# Patient Record
Sex: MALE | Race: WHITE | NOT HISPANIC OR LATINO | URBAN - METROPOLITAN AREA
[De-identification: names, ages, dates, MRNs, and addresses within clinical notes are randomized per-mention and may not be internally consistent; named-entity substitution may affect disease eponyms.]

---

## 2018-10-10 ENCOUNTER — INPATIENT (INPATIENT)
Facility: HOSPITAL | Age: 73
LOS: 2 days | Discharge: HOME | DRG: 446 | End: 2018-10-13
Attending: SPECIALIST | Admitting: SPECIALIST
Payer: COMMERCIAL

## 2018-10-10 VITALS
WEIGHT: 175.05 LBS | OXYGEN SATURATION: 98 % | RESPIRATION RATE: 17 BRPM | DIASTOLIC BLOOD PRESSURE: 80 MMHG | SYSTOLIC BLOOD PRESSURE: 160 MMHG | HEART RATE: 70 BPM | TEMPERATURE: 98 F

## 2018-10-10 DIAGNOSIS — Z95.5 PRESENCE OF CORONARY ANGIOPLASTY IMPLANT AND GRAFT: Chronic | ICD-10-CM

## 2018-10-10 DIAGNOSIS — R10.11 RIGHT UPPER QUADRANT PAIN: ICD-10-CM

## 2018-10-10 DIAGNOSIS — K81.0 ACUTE CHOLECYSTITIS: ICD-10-CM

## 2018-10-10 LAB
ALBUMIN SERPL ELPH-MCNC: 3.9 G/DL — SIGNIFICANT CHANGE UP (ref 3.5–5)
ALP SERPL-CCNC: 41 U/L — SIGNIFICANT CHANGE UP (ref 40–120)
ALT FLD-CCNC: 25 U/L DA — SIGNIFICANT CHANGE UP (ref 10–60)
ANION GAP SERPL CALC-SCNC: 9 MMOL/L — SIGNIFICANT CHANGE UP (ref 5–17)
APPEARANCE UR: CLEAR — SIGNIFICANT CHANGE UP
AST SERPL-CCNC: 23 U/L — SIGNIFICANT CHANGE UP (ref 10–40)
BASOPHILS # BLD AUTO: 0.2 K/UL — SIGNIFICANT CHANGE UP (ref 0–0.2)
BASOPHILS NFR BLD AUTO: 1.8 % — SIGNIFICANT CHANGE UP (ref 0–2)
BILIRUB SERPL-MCNC: 0.3 MG/DL — SIGNIFICANT CHANGE UP (ref 0.2–1.2)
BILIRUB UR-MCNC: NEGATIVE — SIGNIFICANT CHANGE UP
BUN SERPL-MCNC: 19 MG/DL — HIGH (ref 7–18)
CALCIUM SERPL-MCNC: 9.3 MG/DL — SIGNIFICANT CHANGE UP (ref 8.4–10.5)
CHLORIDE SERPL-SCNC: 106 MMOL/L — SIGNIFICANT CHANGE UP (ref 96–108)
CO2 SERPL-SCNC: 24 MMOL/L — SIGNIFICANT CHANGE UP (ref 22–31)
COLOR SPEC: YELLOW — SIGNIFICANT CHANGE UP
CREAT SERPL-MCNC: 0.94 MG/DL — SIGNIFICANT CHANGE UP (ref 0.5–1.3)
DIFF PNL FLD: NEGATIVE — SIGNIFICANT CHANGE UP
EOSINOPHIL # BLD AUTO: 0.4 K/UL — SIGNIFICANT CHANGE UP (ref 0–0.5)
EOSINOPHIL NFR BLD AUTO: 4.8 % — SIGNIFICANT CHANGE UP (ref 0–6)
GLUCOSE SERPL-MCNC: 99 MG/DL — SIGNIFICANT CHANGE UP (ref 70–99)
GLUCOSE UR QL: NEGATIVE — SIGNIFICANT CHANGE UP
HCT VFR BLD CALC: 38.4 % — LOW (ref 39–50)
HGB BLD-MCNC: 13 G/DL — SIGNIFICANT CHANGE UP (ref 13–17)
KETONES UR-MCNC: NEGATIVE — SIGNIFICANT CHANGE UP
LEUKOCYTE ESTERASE UR-ACNC: NEGATIVE — SIGNIFICANT CHANGE UP
LIDOCAIN IGE QN: 199 U/L — SIGNIFICANT CHANGE UP (ref 73–393)
LYMPHOCYTES # BLD AUTO: 2 K/UL — SIGNIFICANT CHANGE UP (ref 1–3.3)
LYMPHOCYTES # BLD AUTO: 23.7 % — SIGNIFICANT CHANGE UP (ref 13–44)
MCHC RBC-ENTMCNC: 33.6 PG — SIGNIFICANT CHANGE UP (ref 27–34)
MCHC RBC-ENTMCNC: 33.9 GM/DL — SIGNIFICANT CHANGE UP (ref 32–36)
MCV RBC AUTO: 99.1 FL — SIGNIFICANT CHANGE UP (ref 80–100)
MONOCYTES # BLD AUTO: 1.3 K/UL — HIGH (ref 0–0.9)
MONOCYTES NFR BLD AUTO: 15.1 % — HIGH (ref 2–14)
NEUTROPHILS # BLD AUTO: 4.7 K/UL — SIGNIFICANT CHANGE UP (ref 1.8–7.4)
NEUTROPHILS NFR BLD AUTO: 54.6 % — SIGNIFICANT CHANGE UP (ref 43–77)
NITRITE UR-MCNC: NEGATIVE — SIGNIFICANT CHANGE UP
PH UR: 8 — SIGNIFICANT CHANGE UP (ref 5–8)
PLATELET # BLD AUTO: 241 K/UL — SIGNIFICANT CHANGE UP (ref 150–400)
POTASSIUM SERPL-MCNC: 4.1 MMOL/L — SIGNIFICANT CHANGE UP (ref 3.5–5.3)
POTASSIUM SERPL-SCNC: 4.1 MMOL/L — SIGNIFICANT CHANGE UP (ref 3.5–5.3)
PROT SERPL-MCNC: 8 G/DL — SIGNIFICANT CHANGE UP (ref 6–8.3)
PROT UR-MCNC: NEGATIVE — SIGNIFICANT CHANGE UP
RBC # BLD: 3.87 M/UL — LOW (ref 4.2–5.8)
RBC # FLD: 12.1 % — SIGNIFICANT CHANGE UP (ref 10.3–14.5)
SODIUM SERPL-SCNC: 139 MMOL/L — SIGNIFICANT CHANGE UP (ref 135–145)
SP GR SPEC: 1.01 — SIGNIFICANT CHANGE UP (ref 1.01–1.02)
UROBILINOGEN FLD QL: NEGATIVE — SIGNIFICANT CHANGE UP
WBC # BLD: 8.7 K/UL — SIGNIFICANT CHANGE UP (ref 3.8–10.5)
WBC # FLD AUTO: 8.7 K/UL — SIGNIFICANT CHANGE UP (ref 3.8–10.5)

## 2018-10-10 PROCEDURE — 74177 CT ABD & PELVIS W/CONTRAST: CPT | Mod: 26

## 2018-10-10 PROCEDURE — 99285 EMERGENCY DEPT VISIT HI MDM: CPT

## 2018-10-10 PROCEDURE — 71046 X-RAY EXAM CHEST 2 VIEWS: CPT | Mod: 26

## 2018-10-10 PROCEDURE — 71260 CT THORAX DX C+: CPT | Mod: 26

## 2018-10-10 RX ORDER — SODIUM CHLORIDE 9 MG/ML
1000 INJECTION INTRAMUSCULAR; INTRAVENOUS; SUBCUTANEOUS ONCE
Qty: 0 | Refills: 0 | Status: COMPLETED | OUTPATIENT
Start: 2018-10-10 | End: 2018-10-10

## 2018-10-10 RX ORDER — MORPHINE SULFATE 50 MG/1
2 CAPSULE, EXTENDED RELEASE ORAL ONCE
Qty: 0 | Refills: 0 | Status: DISCONTINUED | OUTPATIENT
Start: 2018-10-10 | End: 2018-10-13

## 2018-10-10 RX ORDER — CIPROFLOXACIN LACTATE 400MG/40ML
400 VIAL (ML) INTRAVENOUS EVERY 12 HOURS
Qty: 0 | Refills: 0 | Status: DISCONTINUED | OUTPATIENT
Start: 2018-10-10 | End: 2018-10-11

## 2018-10-10 RX ORDER — METRONIDAZOLE 500 MG
500 TABLET ORAL ONCE
Qty: 0 | Refills: 0 | Status: COMPLETED | OUTPATIENT
Start: 2018-10-10 | End: 2018-10-11

## 2018-10-10 RX ADMIN — SODIUM CHLORIDE 2000 MILLILITER(S): 9 INJECTION INTRAMUSCULAR; INTRAVENOUS; SUBCUTANEOUS at 16:12

## 2018-10-10 NOTE — ED PROVIDER NOTE - CHPI ED SYMPTOMS NEG
no diarrhea/no chest pain, shortness of breath, no constipation, no lower extremity edema/no dysuria

## 2018-10-10 NOTE — H&P ADULT - NSHPPHYSICALEXAM_GEN_ALL_CORE
PHYSICAL EXAM:    GENERAL: NAD, well-groomed, well-developed  HEAD:  Atraumatic, Normocephalic  EYES: EOMI, PERRL, conjunctiva and sclera clear  ENMT: Moist mucous membranes, Good dentition, No lesions  NECK: Supple, No JVD  NERVOUS SYSTEM:  Alert & Oriented X3, Good concentration  CHEST/LUNG: Clear to percussion bilaterally; Normal respiratory effort  HEART: Regular rate and rhythm  ABDOMEN: Soft, RUQ tender, Nondistended; Bowel sounds present  : normal external genitalia  BREASTS: no breast lumps  EXTREMITIES:  No clubbing, cyanosis, or edema  VASC: equal peripheral pulses  LYMPH: No lymphadenopathy noted  SKIN: No rashes or lesions  PSYCH: normal affect

## 2018-10-10 NOTE — ED ADULT NURSE NOTE - NSIMPLEMENTINTERV_GEN_ALL_ED
Implemented All Universal Safety Interventions:  [place] to call system. Call bell, personal items and telephone within reach. Instruct patient to call for assistance. Room bathroom lighting operational. Non-slip footwear when patient is off stretcher. Physically safe environment: no spills, clutter or unnecessary equipment. Stretcher in lowest position, wheels locked, appropriate side rails in place.

## 2018-10-10 NOTE — ED PROVIDER NOTE - PHYSICAL EXAMINATION
Afebrile, hemodynamically stable, saturating well  NAD, well appearing  Head NCAT  EOMI grossly, anicteric  MM mildly dry  No JVD  RRR, nml S1/S2, no m/r/g  Lungs CTAB, no w/r/r  Abd soft, NT though exam somewhat limited 2/2 patient tensing stomach muscles despite multiple attempts at exam, ND, nml BS, no rebound or guarding, no CVAT  AAO, CN's 3-12 grossly intact  MENDES spontaneously, no leg cyanosis or edema  Skin warm, well perfused, no rashes or hives

## 2018-10-10 NOTE — H&P ADULT - PROBLEM SELECTOR PLAN 1
RUQ abd pain x 3days  No relief  Biliary colic vs acute cholecystitis  Admit  NPO, IV ABX  Cipro, Flagyl  Medical consult for CAD, Cor Stents on ASA, Plavix.  RUQ US

## 2018-10-10 NOTE — ED PROVIDER NOTE - OBJECTIVE STATEMENT
73 year old M Pt w/ PMHx of HLD, HTN presents to ED c/o right upper quadrant abdominal pain x 3 days. Pain described as severe constant, sharp pain, worsened by movement. Pt reports associated cough, subjective fever as well. Pt used old cream, which improved the pain. Pt denies taking medications. Pt denies chest pain, shortness of breath, lower extremity edema, no diarrhea, no constipation, no dysuria and all other complaints. Pt takes Plavix, Aspirin, Metroprolol. NKDA 73 year old M Pt w/ PMHx of HLD, HTN, CAD s/p stent on Plavix, presents to ED c/o right upper quadrant abdominal pain x 3 days. Pain described as severe constant, sharp pain, worsened by movement and cough. Pt reports associated mild cough, subjective fever on Sunday alone as well. Pt used cold cream, which improved the pain, has not taken other analgesics. Pt denies chest pain, shortness of breath, lower extremity edema, no diarrhea, no constipation, no dysuria and all other complaints. Pt takes Plavix, Aspirin, Metroprolol. NKDA

## 2018-10-10 NOTE — ED PROVIDER NOTE - MEDICAL DECISION MAKING DETAILS
Abdomen appears benign though limited 2/2 voluntary tensing. Character low suspicion for dissection. Will obtain CT C/A/P to evaluate lung findings and r/o rosa, perf'd ulcer. Hemodynamically stable, well appearing, declines analgesia. Signed off care to Dr. Tabares who will f/u and reassess.

## 2018-10-10 NOTE — ED ADULT NURSE NOTE - OBJECTIVE STATEMENT
Pt AOx3, ambulatory, c/o RUQ abdominal pain, denies n/v/f, problems with urination or BM. No other complains.

## 2018-10-10 NOTE — H&P ADULT - NSHPLABSRESULTS_GEN_ALL_CORE
< from: CT Abdomen and Pelvis w/ IV Cont (10.10.18 @ 19:36) >      Impression: 1.0 cm gallstone in the gallbladder neck. No CT evidence for   thickened gallbladder wall or pericholecystic fluid.    Mild aortic arch aneurysm.    Mildly enlarged 1.4 cm lymph node at the zygoesophageal recess.    No evidence for pulmonary consolidation.     Mild fibrotic changes in the periphery of both lungs with lower lung zone   predominance may represent idiopathic pulmonary fibrosis.    Other findings as above.    < end of copied text >

## 2018-10-10 NOTE — H&P ADULT - HISTORY OF PRESENT ILLNESS
73 year old M Pt w/ PMHx of HLD, HTN, CAD s/p stent on Plavix, presents to ED c/o right upper quadrant abdominal pain x 3 days. Pain described as severe constant, sharp pain, worsened by movement and cough. Pt reports associated mild cough, subjective fever on Sunday alone as well. Pt used cold cream, which improved the pain, has not taken other analgesics. Pt denies chest pain, shortness of breath, lower extremity edema, no diarrhea, no constipation, no dysuria and all other complaints. Pt takes Plavix, Aspirin, Metroprolol. NKDA

## 2018-10-10 NOTE — ED ADULT NURSE NOTE - CHPI ED NUR SYMPTOMS NEG
no nausea/no chills/no diarrhea/no hematuria/no blood in stool/no burning urination/no fever/no vomiting/no dysuria

## 2018-10-11 DIAGNOSIS — Z95.5 PRESENCE OF CORONARY ANGIOPLASTY IMPLANT AND GRAFT: ICD-10-CM

## 2018-10-11 DIAGNOSIS — K81.0 ACUTE CHOLECYSTITIS: ICD-10-CM

## 2018-10-11 LAB
ALBUMIN SERPL ELPH-MCNC: 3.4 G/DL — LOW (ref 3.5–5)
ALLERGY+IMMUNOLOGY DIAG STUDY NOTE: SIGNIFICANT CHANGE UP
ALP SERPL-CCNC: 35 U/L — LOW (ref 40–120)
ALT FLD-CCNC: 22 U/L DA — SIGNIFICANT CHANGE UP (ref 10–60)
ANION GAP SERPL CALC-SCNC: 7 MMOL/L — SIGNIFICANT CHANGE UP (ref 5–17)
APTT BLD: 33.1 SEC — SIGNIFICANT CHANGE UP (ref 27.5–37.4)
AST SERPL-CCNC: 21 U/L — SIGNIFICANT CHANGE UP (ref 10–40)
BASOPHILS # BLD AUTO: 0.1 K/UL — SIGNIFICANT CHANGE UP (ref 0–0.2)
BASOPHILS NFR BLD AUTO: 1 % — SIGNIFICANT CHANGE UP (ref 0–2)
BILIRUB SERPL-MCNC: 0.4 MG/DL — SIGNIFICANT CHANGE UP (ref 0.2–1.2)
BUN SERPL-MCNC: 15 MG/DL — SIGNIFICANT CHANGE UP (ref 7–18)
CALCIUM SERPL-MCNC: 8.7 MG/DL — SIGNIFICANT CHANGE UP (ref 8.4–10.5)
CHLORIDE SERPL-SCNC: 103 MMOL/L — SIGNIFICANT CHANGE UP (ref 96–108)
CO2 SERPL-SCNC: 26 MMOL/L — SIGNIFICANT CHANGE UP (ref 22–31)
CREAT SERPL-MCNC: 0.85 MG/DL — SIGNIFICANT CHANGE UP (ref 0.5–1.3)
EOSINOPHIL # BLD AUTO: 0.4 K/UL — SIGNIFICANT CHANGE UP (ref 0–0.5)
EOSINOPHIL NFR BLD AUTO: 5.9 % — SIGNIFICANT CHANGE UP (ref 0–6)
GLUCOSE SERPL-MCNC: 123 MG/DL — HIGH (ref 70–99)
HCT VFR BLD CALC: 33.9 % — LOW (ref 39–50)
HGB BLD-MCNC: 11.5 G/DL — LOW (ref 13–17)
INR BLD: 1.13 RATIO — SIGNIFICANT CHANGE UP (ref 0.88–1.16)
LYMPHOCYTES # BLD AUTO: 1.6 K/UL — SIGNIFICANT CHANGE UP (ref 1–3.3)
LYMPHOCYTES # BLD AUTO: 23.6 % — SIGNIFICANT CHANGE UP (ref 13–44)
MCHC RBC-ENTMCNC: 33.8 PG — SIGNIFICANT CHANGE UP (ref 27–34)
MCHC RBC-ENTMCNC: 33.9 GM/DL — SIGNIFICANT CHANGE UP (ref 32–36)
MCV RBC AUTO: 99.8 FL — SIGNIFICANT CHANGE UP (ref 80–100)
MONOCYTES # BLD AUTO: 1.5 K/UL — HIGH (ref 0–0.9)
MONOCYTES NFR BLD AUTO: 21.1 % — HIGH (ref 2–14)
NEUTROPHILS # BLD AUTO: 3.4 K/UL — SIGNIFICANT CHANGE UP (ref 1.8–7.4)
NEUTROPHILS NFR BLD AUTO: 48.5 % — SIGNIFICANT CHANGE UP (ref 43–77)
PLATELET # BLD AUTO: 204 K/UL — SIGNIFICANT CHANGE UP (ref 150–400)
POTASSIUM SERPL-MCNC: 3.4 MMOL/L — LOW (ref 3.5–5.3)
POTASSIUM SERPL-SCNC: 3.4 MMOL/L — LOW (ref 3.5–5.3)
PROT SERPL-MCNC: 6.9 G/DL — SIGNIFICANT CHANGE UP (ref 6–8.3)
PROTHROM AB SERPL-ACNC: 12.3 SEC — SIGNIFICANT CHANGE UP (ref 9.8–12.7)
RBC # BLD: 3.4 M/UL — LOW (ref 4.2–5.8)
RBC # FLD: 12.2 % — SIGNIFICANT CHANGE UP (ref 10.3–14.5)
SODIUM SERPL-SCNC: 136 MMOL/L — SIGNIFICANT CHANGE UP (ref 135–145)
WBC # BLD: 7 K/UL — SIGNIFICANT CHANGE UP (ref 3.8–10.5)
WBC # FLD AUTO: 7 K/UL — SIGNIFICANT CHANGE UP (ref 3.8–10.5)

## 2018-10-11 PROCEDURE — 76705 ECHO EXAM OF ABDOMEN: CPT | Mod: 26

## 2018-10-11 RX ORDER — METRONIDAZOLE 500 MG
500 TABLET ORAL EVERY 8 HOURS
Qty: 0 | Refills: 0 | Status: DISCONTINUED | OUTPATIENT
Start: 2018-10-11 | End: 2018-10-13

## 2018-10-11 RX ORDER — ENOXAPARIN SODIUM 100 MG/ML
40 INJECTION SUBCUTANEOUS DAILY
Qty: 0 | Refills: 0 | Status: DISCONTINUED | OUTPATIENT
Start: 2018-10-11 | End: 2018-10-13

## 2018-10-11 RX ORDER — ASPIRIN/CALCIUM CARB/MAGNESIUM 324 MG
81 TABLET ORAL DAILY
Qty: 0 | Refills: 0 | Status: DISCONTINUED | OUTPATIENT
Start: 2018-10-11 | End: 2018-10-13

## 2018-10-11 RX ORDER — CLOPIDOGREL BISULFATE 75 MG/1
75 TABLET, FILM COATED ORAL DAILY
Qty: 0 | Refills: 0 | Status: DISCONTINUED | OUTPATIENT
Start: 2018-10-11 | End: 2018-10-13

## 2018-10-11 RX ORDER — DEXTROSE MONOHYDRATE, SODIUM CHLORIDE, AND POTASSIUM CHLORIDE 50; .745; 4.5 G/1000ML; G/1000ML; G/1000ML
1000 INJECTION, SOLUTION INTRAVENOUS
Qty: 0 | Refills: 0 | Status: DISCONTINUED | OUTPATIENT
Start: 2018-10-11 | End: 2018-10-13

## 2018-10-11 RX ORDER — CIPROFLOXACIN LACTATE 400MG/40ML
400 VIAL (ML) INTRAVENOUS EVERY 12 HOURS
Qty: 0 | Refills: 0 | Status: DISCONTINUED | OUTPATIENT
Start: 2018-10-11 | End: 2018-10-13

## 2018-10-11 RX ADMIN — Medication 81 MILLIGRAM(S): at 12:57

## 2018-10-11 RX ADMIN — Medication 100 MILLIGRAM(S): at 02:05

## 2018-10-11 RX ADMIN — DEXTROSE MONOHYDRATE, SODIUM CHLORIDE, AND POTASSIUM CHLORIDE 125 MILLILITER(S): 50; .745; 4.5 INJECTION, SOLUTION INTRAVENOUS at 02:06

## 2018-10-11 RX ADMIN — Medication 100 MILLIGRAM(S): at 19:36

## 2018-10-11 RX ADMIN — Medication 100 MILLIGRAM(S): at 12:57

## 2018-10-11 RX ADMIN — Medication 200 MILLIGRAM(S): at 18:01

## 2018-10-11 RX ADMIN — ENOXAPARIN SODIUM 40 MILLIGRAM(S): 100 INJECTION SUBCUTANEOUS at 12:57

## 2018-10-11 RX ADMIN — CLOPIDOGREL BISULFATE 75 MILLIGRAM(S): 75 TABLET, FILM COATED ORAL at 12:57

## 2018-10-11 RX ADMIN — Medication 200 MILLIGRAM(S): at 05:33

## 2018-10-11 NOTE — CONSULT NOTE ADULT - SUBJECTIVE AND OBJECTIVE BOX
Patient is a 73y old  Male who presents with a chief complaint of abdominal pain. Patient had CAD with drug eluting stents placed in Oct 2018. Patient is on aspirin and plavix since then. Denies chest pain, SOB, nausea, vomiting, diarrhea or constipation.           INTERVAL HPI/OVERNIGHT EVENTS:  T(C): 36.4 (10-11-18 @ 05:10), Max: 37 (10-11-18 @ 00:30)  HR: 62 (10-11-18 @ 05:10) (60 - 75)  BP: 111/56 (10-11-18 @ 05:10) (111/56 - 160/80)  RR: 18 (10-11-18 @ 05:10) (16 - 18)  SpO2: 99% (10-11-18 @ 05:10) (96% - 100%)  Wt(kg): --  I&O's Summary    10 Oct 2018 07:01  -  11 Oct 2018 07:00  --------------------------------------------------------  IN: 1050 mL / OUT: 0 mL / NET: 1050 mL        Herbert Mahmood; Jackelyn Orr; [facility]; Real Tabares; [facility]; [facility]; Levon Azevedo; Katerin Al; Katerin Al; Asia Damon    LABS:                        11.5   7.0   )-----------( 204      ( 11 Oct 2018 06:46 )             33.9     10-11    136  |  103  |  15  ----------------------------<  123<H>  3.4<L>   |  26  |  0.85    Ca    8.7      11 Oct 2018 06:46    TPro  6.9  /  Alb  3.4<L>  /  TBili  0.4  /  DBili  x   /  AST  21  /  ALT  22  /  AlkPhos  35<L>  10-11    PT/INR - ( 11 Oct 2018 00:50 )   PT: 12.3 sec;   INR: 1.13 ratio         PTT - ( 11 Oct 2018 00:50 )  PTT:33.1 sec  Urinalysis Basic - ( 10 Oct 2018 16:17 )    Color: Yellow / Appearance: Clear / S.015 / pH: x  Gluc: x / Ketone: Negative  / Bili: Negative / Urobili: Negative   Blood: x / Protein: Negative / Nitrite: Negative   Leuk Esterase: Negative / RBC: x / WBC x   Sq Epi: x / Non Sq Epi: x / Bacteria: x      CAPILLARY BLOOD GLUCOSE            Urinalysis Basic - ( 10 Oct 2018 16:17 )    Color: Yellow / Appearance: Clear / S.015 / pH: x  Gluc: x / Ketone: Negative  / Bili: Negative / Urobili: Negative   Blood: x / Protein: Negative / Nitrite: Negative   Leuk Esterase: Negative / RBC: x / WBC x   Sq Epi: x / Non Sq Epi: x / Bacteria: x          REVIEW OF SYSTEMS:    CONSTITUTIONAL: No weakness, fevers or chills  NECK: No pain or stiffness  RESPIRATORY: No cough, wheezing, hemoptysis; No shortness of breath  CARDIOVASCULAR: No chest pain or palpitations  GASTROINTESTINAL: + abdominal or epigastric pain. No nausea, vomiting, No diarrhea or constipation. No melena or hematochezia.  GENITOURINARY: No dysuria, frequency or hematuria  NEUROLOGICAL: No numbness or weakness  All other review of systems is negative unless indicated above      PHYSICAL EXAM:  GENERAL: NAD, well-groomed, well-developed  EYES: EOMI, PERRLA,   ENMT: No tonsillar erythema, exudates, or enlargement;   NECK: Supple, No JVD, Normal thyroid  NERVOUS SYSTEM:  Alert & Oriented X3, Good concentration; Motor Strength 5/5 B/L upper and lower extremities; DTRs 2+ intact and symmetric  CHEST/LUNG: Clear to percussion bilaterally; No rales, rhonchi, wheezing, or rubs  HEART: Regular rate and rhythm; No murmurs, rubs, or gallops  ABDOMEN: Soft, Nontender, Nondistended; Bowel sounds present  EXTREMITIES:  2+ Peripheral Pulses, No clubbing, cyanosis, or edema      Care Discussed with primary team Patient is a 73y old  Male who presents with a chief complaint of abdominal pain. Patient had CAD with drug eluting stents placed in 2017. Patient is on aspirin and plavix since then. Denies chest pain, SOB, nausea, vomiting, diarrhea or constipation.           INTERVAL HPI/OVERNIGHT EVENTS:  T(C): 36.4 (10-11-18 @ 05:10), Max: 37 (10-11-18 @ 00:30)  HR: 62 (10-11-18 @ 05:10) (60 - 75)  BP: 111/56 (10-11-18 @ 05:10) (111/56 - 160/80)  RR: 18 (10-11-18 @ 05:10) (16 - 18)  SpO2: 99% (10-11-18 @ 05:10) (96% - 100%)  Wt(kg): --  I&O's Summary    10 Oct 2018 07:01  -  11 Oct 2018 07:00  --------------------------------------------------------  IN: 1050 mL / OUT: 0 mL / NET: 1050 mL        Herbert Mahmood; Jackelyn Orr; [facility]; Real Tabares; [facility]; [facility]; Levon Azevedo; Katerin Al; Katerin Al; Asia Damon    LABS:                        11.5   7.0   )-----------( 204      ( 11 Oct 2018 06:46 )             33.9     10-11    136  |  103  |  15  ----------------------------<  123<H>  3.4<L>   |  26  |  0.85    Ca    8.7      11 Oct 2018 06:46    TPro  6.9  /  Alb  3.4<L>  /  TBili  0.4  /  DBili  x   /  AST  21  /  ALT  22  /  AlkPhos  35<L>  10-11    PT/INR - ( 11 Oct 2018 00:50 )   PT: 12.3 sec;   INR: 1.13 ratio         PTT - ( 11 Oct 2018 00:50 )  PTT:33.1 sec  Urinalysis Basic - ( 10 Oct 2018 16:17 )    Color: Yellow / Appearance: Clear / S.015 / pH: x  Gluc: x / Ketone: Negative  / Bili: Negative / Urobili: Negative   Blood: x / Protein: Negative / Nitrite: Negative   Leuk Esterase: Negative / RBC: x / WBC x   Sq Epi: x / Non Sq Epi: x / Bacteria: x      CAPILLARY BLOOD GLUCOSE            Urinalysis Basic - ( 10 Oct 2018 16:17 )    Color: Yellow / Appearance: Clear / S.015 / pH: x  Gluc: x / Ketone: Negative  / Bili: Negative / Urobili: Negative   Blood: x / Protein: Negative / Nitrite: Negative   Leuk Esterase: Negative / RBC: x / WBC x   Sq Epi: x / Non Sq Epi: x / Bacteria: x          REVIEW OF SYSTEMS:    CONSTITUTIONAL: No weakness, fevers or chills  NECK: No pain or stiffness  RESPIRATORY: No cough, wheezing, hemoptysis; No shortness of breath  CARDIOVASCULAR: No chest pain or palpitations  GASTROINTESTINAL: + abdominal or epigastric pain. No nausea, vomiting, No diarrhea or constipation. No melena or hematochezia.  GENITOURINARY: No dysuria, frequency or hematuria  NEUROLOGICAL: No numbness or weakness  All other review of systems is negative unless indicated above      PHYSICAL EXAM:  GENERAL: NAD, well-groomed, well-developed  EYES: EOMI, PERRLA,   ENMT: No tonsillar erythema, exudates, or enlargement;   NECK: Supple, No JVD, Normal thyroid  NERVOUS SYSTEM:  Alert & Oriented X3, Good concentration; Motor Strength 5/5 B/L upper and lower extremities; DTRs 2+ intact and symmetric  CHEST/LUNG: Clear to percussion bilaterally; No rales, rhonchi, wheezing, or rubs  HEART: Regular rate and rhythm; No murmurs, rubs, or gallops  ABDOMEN: Soft, Nontender, Nondistended; Bowel sounds present  EXTREMITIES:  2+ Peripheral Pulses, No clubbing, cyanosis, or edema      Care Discussed with primary team

## 2018-10-11 NOTE — CONSULT NOTE ADULT - ASSESSMENT
73 year old M Pt w/ PMHx of HLD, HTN, CAD s/p stent on Plavix, presents to ED c/o right upper quadrant abdominal pain x 3 days.   1.D/W pt and Dr. Salazar, PTCAx3 RCA 11/14/17 can stop plavix afte 1 yr.  2.ABX.  3.CAD-asa,plavix,statin.  4.PPI.  5.DVT prophylaxis.

## 2018-10-11 NOTE — PROGRESS NOTE ADULT - SUBJECTIVE AND OBJECTIVE BOX
INTERVAL HPI/OVERNIGHT EVENTS:  Pt stable.   NPO  flatus, no BM    Vital Signs Last 24 Hrs  T(C): 36.4 (11 Oct 2018 05:10), Max: 37 (11 Oct 2018 00:30)  T(F): 97.5 (11 Oct 2018 05:10), Max: 98.6 (11 Oct 2018 00:30)  HR: 62 (11 Oct 2018 05:10) (60 - 75)  BP: 111/56 (11 Oct 2018 05:10) (111/56 - 160/80)  BP(mean): --  RR: 18 (11 Oct 2018 05:10) (16 - 18)  SpO2: 99% (11 Oct 2018 05:10) (96% - 100%)    Physical:  Abdomen: Soft nondistended, moderate RUQ tenderness  No masses    I&O's Summary    10 Oct 2018 07:01  -  11 Oct 2018 07:00  --------------------------------------------------------  IN: 1050 mL / OUT: 0 mL / NET: 1050 mL        LABS:                        11.5   7.0   )-----------( 204      ( 11 Oct 2018 06:46 )             33.9             10-11    136  |  103  |  15  ----------------------------<  123<H>  3.4<L>   |  26  |  0.85    Ca    8.7      11 Oct 2018 06:46    TPro  6.9  /  Alb  3.4<L>  /  TBili  0.4  /  DBili  x   /  AST  21  /  ALT  22  /  AlkPhos  35<L>  10-11

## 2018-10-11 NOTE — CONSULT NOTE ADULT - PROBLEM SELECTOR RECOMMENDATION 2
Patient has drug eluting CAD with stents placed 1 year ago  Denies shortness of breath or chest pain  METS >4, patient can climb 2 flights of stairs without getting SOB  Continue aspirin and plavix as per cardio Dr Culver  Cardio to follow for medical clearance Patient has drug eluting CAD with stents placed 1 year ago 10/14/2017  Denies shortness of breath or chest pain  METS >4, patient can climb 2 flights of stairs without getting SOB  Continue aspirin and plavix until 11/14/18 as per cardio Dr Culver  Cardio to follow for medical clearance Patient has drug eluting CAD with stents placed 1 year ago 11/14/2017  Denies shortness of breath or chest pain  METS >4, patient can climb 2 flights of stairs without getting SOB  Continue aspirin and plavix until 11/14/18 as per cardio Dr Culver  Cardio to follow for medical clearance

## 2018-10-11 NOTE — CONSULT NOTE ADULT - SUBJECTIVE AND OBJECTIVE BOX
CHIEF COMPLAINT:Patient is a 73y old  Male who presents with a chief complaint of Abdominal pain (11 Oct 2018 11:24)      HPI:  73 year old M Pt w/ PMHx of HLD, HTN, CAD s/p stent on Plavix, presents to ED c/o right upper quadrant abdominal pain x 3 days. Pain described as severe constant, sharp pain, worsened by movement and cough. Pt reports associated mild cough, subjective fever on Sunday alone as well. Pt used cold cream, which improved the pain, has not taken other analgesics. Pt denies chest pain, shortness of breath, lower extremity edema, no diarrhea, no constipation, no dysuria and all other complaints. Pt takes Plavix, Aspirin, Metroprolol. NKDA (10 Oct 2018 23:47)      PAST MEDICAL & SURGICAL HISTORY:  HTN (hypertension)  HLD (hyperlipidemia)  CAD (coronary artery disease)- [id number] 4/16, PTCAx1 LAD 10/12/17 and RCAx3 11/14/17  No significant past surgical history  H/O heart artery stent      MEDICATIONS  (STANDING):  aspirin  chewable 81 milliGRAM(s) Oral daily  ciprofloxacin   IVPB 400 milliGRAM(s) IV Intermittent every 12 hours  clopidogrel Tablet 75 milliGRAM(s) Oral daily  dextrose 5% + sodium chloride 0.45% with potassium chloride 20 mEq/L 1000 milliLiter(s) (125 mL/Hr) IV Continuous <Continuous>  enoxaparin Injectable 40 milliGRAM(s) SubCutaneous daily  metroNIDAZOLE  IVPB 500 milliGRAM(s) IV Intermittent every 8 hours  morphine  - Injectable 2 milliGRAM(s) IV Push once    MEDICATIONS  (PRN):      FAMILY HISTORY:  No pertinent family history in first degree relatives      SOCIAL HISTORY:    [x ] Non-smoker    [x ] Alcohol-denies    Allergies    No Known Allergies    Intolerances    	    REVIEW OF SYSTEMS:  CONSTITUTIONAL: No fever, weight loss, or fatigue  EYES: No eye pain, visual disturbances, or discharge  ENT:  No difficulty hearing, tinnitus, vertigo; No sinus or throat pain  NECK: No pain or stiffness  RESPIRATORY: No cough, wheezing, chills or hemoptysis; No Shortness of Breath  CARDIOVASCULAR: + chest pain,No palpitations, passing out, dizziness, or leg swelling  GASTROINTESTINAL: No abdominal or epigastric pain. No nausea, vomiting, or hematemesis; No diarrhea or constipation. No melena or hematochezia.  GENITOURINARY: No dysuria, frequency, hematuria, or incontinence  NEUROLOGICAL: No headaches, memory loss, loss of strength, numbness, or tremors  SKIN: No itching, burning, rashes, or lesions   LYMPH Nodes: No enlarged glands  ENDOCRINE: No heat or cold intolerance; No hair loss  MUSCULOSKELETAL: No joint pain or swelling; No muscle, back, or extremity pain  PSYCHIATRIC: No depression, anxiety, mood swings, or difficulty sleeping  HEME/LYMPH: No easy bruising, or bleeding gums  ALLERGY AND IMMUNOLOGIC: No hives or eczema	    PHYSICAL EXAM:  T(C): 36.4 (10-11-18 @ 05:10), Max: 37 (10-11-18 @ 00:30)  HR: 62 (10-11-18 @ 05:10) (60 - 75)  BP: 111/56 (10-11-18 @ 05:10) (111/56 - 160/80)  RR: 18 (10-11-18 @ 05:10) (16 - 18)  SpO2: 99% (10-11-18 @ 05:10) (96% - 100%)  Wt(kg): --  I&O's Summary    10 Oct 2018 07:01  -  11 Oct 2018 07:00  --------------------------------------------------------  IN: 1050 mL / OUT: 0 mL / NET: 1050 mL        Appearance: Normal	  HEENT:   Normal oral mucosa, PERRL, EOMI	  Lymphatic: No lymphadenopathy  Cardiovascular: Normal S1 S2, No JVD, No murmurs, No edema  Respiratory: Lungs clear to auscultation	  Psychiatry: A & O x 3, Mood & affect appropriate  Gastrointestinal:  Soft, Non-tender, + BS	  Skin: No rashes, No ecchymoses, No cyanosis	  Neurologic: Non-focal  Extremities: Normal range of motion, No clubbing, cyanosis or edema  Vascular: Peripheral pulses palpable 2+ bilaterally        ECG:  	not in chart    LABS:	 	    CARDIAC MARKERS:  CARDIAC MARKERS ( 10 Oct 2018 16:17 )  <0.015 ng/mL / x     / x     / x     / x                                  11.5   7.0   )-----------( 204      ( 11 Oct 2018 06:46 )             33.9     10-11    136  |  103  |  15  ----------------------------<  123<H>  3.4<L>   |  26  |  0.85    Ca    8.7      11 Oct 2018 06:46    TPro  6.9  /  Alb  3.4<L>  /  TBili  0.4  /  DBili  x   /  AST  21  /  ALT  22  /  AlkPhos  35<L>  10-11    EXAM:  CT CHEST IC                          EXAM:  CT ABDOMEN AND PELVIS IC                            PROCEDURE DATE:  10/10/2018          INTERPRETATION:  CT of the chest, abdomen and pelvis with IV contrast    Clinical Indication: Right upper quadrant abdominal pain. Questionable   pneumonia.    Technique: Axial multidetector CT images of the chest, abdomen and pelvis   are acquired following the administration of IV contrast (75 cc   Omnipaque-350 administered, 5 cc discarded).    Comparison: None.    Findings:     Chest: No pleural or pericardial effusion. The heart is not enlarged.   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 Aortic and coronary   artery calcifications are present.    Mildly enlarged 1.4 cm lymph node at the zygoesophageal recess. No   enlarged hilar, or axillary lymph node.     The trachea and central bronchi appear unremarkable.    No evidence for pulmonary consolidation. Small bilateral atelectasis.   Mild fibrotic changes in the periphery of both lungs with lower lung zone   predominance may represent idiopathic pulmonary fibrosis. Mild right   pleural calcifications.    Abdomen/pelvis: 1.0 cm gallstone in the gallbladder neck. No CT evidence   for thickened gallbladder wall or pericholecystic fluid.The common bile   duct is not dilated.     Mild hepatic steatosis. The pancreas, spleen, adrenals and kidneys appear   unremarkable.    The appendix is not visualized. There are no secondary signs for acute   appendicitis. No bowel obstruction, or grossly thickened bowel wall.    No evidence for free air, ascites, or enlarged lymph node.    The urinary bladder is within normal limits. The prostate appears grossly   unremarkable.    Impression: 1.0 cm gallstone in the gallbladder neck. No CT evidence for   thickened gallbladder wall or pericholecystic fluid.    Mild aortic arch aneurysm.    Mildly enlarged 1.4 cm lymph node at the zygoesophageal recess.    No evidence for pulmonary consolidation.     Mild fibrotic changes in the periphery of both lungs with lower lung zone   predominance may represent idiopathic pulmonary fibrosis.    Other findings as above.

## 2018-10-11 NOTE — PROGRESS NOTE ADULT - SUBJECTIVE AND OBJECTIVE BOX
Pt seen at bedside  Patient is a 73y old  Male who presents with a chief complaint of abdominal pain    INTERVAL HPI/OVERNIGHT EVENTS:  Pt  states with R mid abdominal pain. Denies nausea or vomiting  Pt states has history of coronary artery stent  1 year ago. He remains on Plavix and Aspirin    Vital Signs Last 24 Hrs  T(C): 36.4 (11 Oct 2018 05:10), Max: 37 (11 Oct 2018 00:30)  T(F): 97.5 (11 Oct 2018 05:10), Max: 98.6 (11 Oct 2018 00:30)  HR: 62 (11 Oct 2018 05:10) (60 - 75)  BP: 111/56 (11 Oct 2018 05:10) (111/56 - 160/80)  BP(mean): --  RR: 18 (11 Oct 2018 05:10) (16 - 18)  SpO2: 99% (11 Oct 2018 05:10) (96% - 100%)    Physical Exam:    Gen: awake, alert oriented NAD  HEENT: anicteric  Abd: soft, ++tenderness RUQ, not distended  Pelvic: normal external genitalia, no hernias  Ext: warm to touch no c/c/e    MEDICATIONS  (STANDING):  ciprofloxacin   IVPB 400 milliGRAM(s) IV Intermittent every 12 hours  dextrose 5% + sodium chloride 0.45% with potassium chloride 20 mEq/L 1000 milliLiter(s) (125 mL/Hr) IV Continuous <Continuous>  enoxaparin Injectable 40 milliGRAM(s) SubCutaneous daily  metroNIDAZOLE  IVPB 500 milliGRAM(s) IV Intermittent every 8 hours  morphine  - Injectable 2 milliGRAM(s) IV Push once    MEDICATIONS  (PRN):                            11.5   7.0   )-----------( 204      ( 11 Oct 2018 06:46 )             33.9     10-11    136  |  103  |  15  ----------------------------<  123<H>  3.4<L>   |  26  |  0.85    Ca    8.7      11 Oct 2018 06:46    TPro  6.9  /  Alb  3.4<L>  /  TBili  0.4  /  DBili  x   /  AST  21  /  ALT  22  /  AlkPhos  35<L>  10-11    PT/INR - ( 11 Oct 2018 00:50 )   PT: 12.3 sec;   INR: 1.13 ratio         PTT - ( 11 Oct 2018 00:50 )  PTT:33.1 sec    I&O's Detail    10 Oct 2018 07:01  -  11 Oct 2018 07:00  --------------------------------------------------------  IN:    dextrose 5% + sodium chloride 0.45% with potassium chloride 20 mEq/L: 750 mL    IV PiggyBack: 300 mL  Total IN: 1050 mL    OUT:  Total OUT: 0 mL    Total NET: 1050 mL

## 2018-10-12 RX ADMIN — Medication 200 MILLIGRAM(S): at 17:25

## 2018-10-12 RX ADMIN — Medication 100 MILLIGRAM(S): at 05:02

## 2018-10-12 RX ADMIN — Medication 200 MILLIGRAM(S): at 05:02

## 2018-10-12 RX ADMIN — DEXTROSE MONOHYDRATE, SODIUM CHLORIDE, AND POTASSIUM CHLORIDE 125 MILLILITER(S): 50; .745; 4.5 INJECTION, SOLUTION INTRAVENOUS at 12:10

## 2018-10-12 RX ADMIN — ENOXAPARIN SODIUM 40 MILLIGRAM(S): 100 INJECTION SUBCUTANEOUS at 12:11

## 2018-10-12 RX ADMIN — Medication 100 MILLIGRAM(S): at 21:24

## 2018-10-12 RX ADMIN — CLOPIDOGREL BISULFATE 75 MILLIGRAM(S): 75 TABLET, FILM COATED ORAL at 12:11

## 2018-10-12 RX ADMIN — Medication 100 MILLIGRAM(S): at 12:11

## 2018-10-12 RX ADMIN — Medication 81 MILLIGRAM(S): at 12:11

## 2018-10-12 NOTE — PROGRESS NOTE ADULT - SUBJECTIVE AND OBJECTIVE BOX
CHIEF COMPLAINT:Patient is a 73y old  Male who presents with a chief complaint of Abdominal pain .Pt appears comfortable.    	  REVIEW OF SYSTEMS:  CONSTITUTIONAL: No fever, weight loss, or fatigue  EYES: No eye pain, visual disturbances, or discharge  ENT:  No difficulty hearing, tinnitus, vertigo; No sinus or throat pain  NECK: No pain or stiffness  RESPIRATORY: No cough, wheezing, chills or hemoptysis; No Shortness of Breath  CARDIOVASCULAR: No chest pain, palpitations, passing out, dizziness, or leg swelling  GASTROINTESTINAL: No abdominal or epigastric pain. No nausea, vomiting, or hematemesis; No diarrhea or constipation. No melena or hematochezia.  GENITOURINARY: No dysuria, frequency, hematuria, or incontinence  NEUROLOGICAL: No headaches, memory loss, loss of strength, numbness, or tremors  SKIN: No itching, burning, rashes, or lesions   LYMPH Nodes: No enlarged glands  ENDOCRINE: No heat or cold intolerance; No hair loss  MUSCULOSKELETAL: No joint pain or swelling; No muscle, back, or extremity pain  PSYCHIATRIC: No depression, anxiety, mood swings, or difficulty sleeping  HEME/LYMPH: No easy bruising, or bleeding gums  ALLERGY AND IMMUNOLOGIC: No hives or eczema	      PHYSICAL EXAM:  T(C): 36.6 (10-11-18 @ 21:36), Max: 36.6 (10-11-18 @ 21:36)  HR: 73 (10-11-18 @ 21:36) (53 - 73)  BP: 143/65 (10-11-18 @ 21:36) (142/59 - 143/65)  RR: 18 (10-11-18 @ 21:36) (18 - 18)  SpO2: 100% (10-11-18 @ 21:36) (100% - 100%)      Appearance: Normal	  HEENT:   Normal oral mucosa, PERRL, EOMI	  Lymphatic: No lymphadenopathy  Cardiovascular: Normal S1 S2, No JVD, No murmurs, No edema  Respiratory: Lungs clear to auscultation	  Psychiatry: A & O x 3, Mood & affect appropriate  Gastrointestinal:  Soft, Non-tender, + BS	  Skin: No rashes, No ecchymoses, No cyanosis	  Neurologic: Non-focal  Extremities: Normal range of motion, No clubbing, cyanosis or edema  Vascular: Peripheral pulses palpable 2+ bilaterally    MEDICATIONS  (STANDING):  aspirin  chewable 81 milliGRAM(s) Oral daily  ciprofloxacin   IVPB 400 milliGRAM(s) IV Intermittent every 12 hours  clopidogrel Tablet 75 milliGRAM(s) Oral daily  dextrose 5% + sodium chloride 0.45% with potassium chloride 20 mEq/L 1000 milliLiter(s) (125 mL/Hr) IV Continuous <Continuous>  enoxaparin Injectable 40 milliGRAM(s) SubCutaneous daily  metroNIDAZOLE  IVPB 500 milliGRAM(s) IV Intermittent every 8 hours  morphine  - Injectable 2 milliGRAM(s) IV Push once      	  LABS:	 	    CARDIAC MARKERS:  CARDIAC MARKERS ( 10 Oct 2018 16:17 )  <0.015 ng/mL / x     / x     / x     / x                            11.5   7.0   )-----------( 204      ( 11 Oct 2018 06:46 )             33.9     10-11    136  |  103  |  15  ----------------------------<  123<H>  3.4<L>   |  26  |  0.85    Ca    8.7      11 Oct 2018 06:46    TPro  6.9  /  Alb  3.4<L>  /  TBili  0.4  /  DBili  x   /  AST  21  /  ALT  22  /  AlkPhos  35<L>  10-11      EXAM:  US ABDOMEN LIMITED                            PROCEDURE DATE:  10/11/2018          INTERPRETATION:  History: Abdominal pain.    Right quadrant ultrasound.    Gallbladder distended with large shadowing gallstones in the gallbladder   neck. Large amount of intraluminal gallbladder sludge. No gallbladder   wall thickening or pericholecystic fluid. No biliary dilatation. Common   duct 0.4 cm. Diffuse fatty liver. Pancreas not adequately visualized.   Right kidney 11.2 cm long dimension, unremarkable. No ascites.    Impression: Cholelithiasis and gallbladder sludge. If there is a high   pretest probability of cholecystitis/cystic duct obstruction HIDA scan   can be obtained. No biliary dilatation. Fatty liver.

## 2018-10-12 NOTE — PROGRESS NOTE ADULT - SUBJECTIVE AND OBJECTIVE BOX
Patient is a 73y old  Male who presents with a chief complaint of Abdominal pain (11 Oct 2018 12:12)    pt seen in icu [  ], reg med floor [   ], bed [  ], chair at bedside [   ], a+o x3 [  ], lethargic [  ],  nad [  ]    holman [  ], ngt [  ], peg [  ], et tube [  ], cent line [  ], picc line [  ]        Allergies    No Known Allergies        Vitals    T(F): 97.9 (10-11-18 @ 21:36), Max: 97.9 (10-11-18 @ 21:36)  HR: 73 (10-11-18 @ 21:36) (53 - 73)  BP: 143/65 (10-11-18 @ 21:36) (142/59 - 143/65)  RR: 18 (10-11-18 @ 21:36) (18 - 18)  SpO2: 100% (10-11-18 @ 21:36) (100% - 100%)  Wt(kg): --  CAPILLARY BLOOD GLUCOSE          Labs                          11.5   7.0   )-----------( 204      ( 11 Oct 2018 06:46 )             33.9       10-11    136  |  103  |  15  ----------------------------<  123<H>  3.4<L>   |  26  |  0.85    Ca    8.7      11 Oct 2018 06:46    TPro  6.9  /  Alb  3.4<L>  /  TBili  0.4  /  DBili  x   /  AST  21  /  ALT  22  /  AlkPhos  35<L>  10-11      CARDIAC MARKERS ( 10 Oct 2018 16:17 )  <0.015 ng/mL / x     / x     / x     / x                Radiology Results      Meds    MEDICATIONS  (STANDING):  aspirin  chewable 81 milliGRAM(s) Oral daily  ciprofloxacin   IVPB 400 milliGRAM(s) IV Intermittent every 12 hours  clopidogrel Tablet 75 milliGRAM(s) Oral daily  dextrose 5% + sodium chloride 0.45% with potassium chloride 20 mEq/L 1000 milliLiter(s) (125 mL/Hr) IV Continuous <Continuous>  enoxaparin Injectable 40 milliGRAM(s) SubCutaneous daily  metroNIDAZOLE  IVPB 500 milliGRAM(s) IV Intermittent every 8 hours  morphine  - Injectable 2 milliGRAM(s) IV Push once      MEDICATIONS  (PRN):      Physical Exam    Neuro :  no focal deficits  Respiratory: CTA B/L  CV: RRR, S1S2, no murmurs,   Abdominal: Soft, RUQ tender to mod palp, ND +BS,  Extremities: No edema, + peripheral pulses    ASSESSMENT    Acute cholecystitis  h/o Stented coronary artery  HTN (hypertension)  HLD (hyperlipidemia)  CAD (coronary artery disease)          PLAN    pt npo  cont cipro and flagyl  cardio f/u  cont plavix until 11/14/18  cont other cardiac meds  cont pain control  f/u labs  cont current meds Patient is a 73y old  Male who presents with a chief complaint of Abdominal pain (11 Oct 2018 12:12)    pt seen in icu [  ], reg med floor [  x ], bed [x  ], chair at bedside [   ], a+o x3 [ x ], lethargic [  ],  nad [x  ]      Allergies    No Known Allergies        Vitals    T(F): 97.9 (10-11-18 @ 21:36), Max: 97.9 (10-11-18 @ 21:36)  HR: 73 (10-11-18 @ 21:36) (53 - 73)  BP: 143/65 (10-11-18 @ 21:36) (142/59 - 143/65)  RR: 18 (10-11-18 @ 21:36) (18 - 18)  SpO2: 100% (10-11-18 @ 21:36) (100% - 100%)  Wt(kg): --  CAPILLARY BLOOD GLUCOSE          Labs                          11.5   7.0   )-----------( 204      ( 11 Oct 2018 06:46 )             33.9       10-11    136  |  103  |  15  ----------------------------<  123<H>  3.4<L>   |  26  |  0.85    Ca    8.7      11 Oct 2018 06:46    TPro  6.9  /  Alb  3.4<L>  /  TBili  0.4  /  DBili  x   /  AST  21  /  ALT  22  /  AlkPhos  35<L>  10-11      CARDIAC MARKERS ( 10 Oct 2018 16:17 )  <0.015 ng/mL / x     / x     / x     / x                Radiology Results      Meds    MEDICATIONS  (STANDING):  aspirin  chewable 81 milliGRAM(s) Oral daily  ciprofloxacin   IVPB 400 milliGRAM(s) IV Intermittent every 12 hours  clopidogrel Tablet 75 milliGRAM(s) Oral daily  dextrose 5% + sodium chloride 0.45% with potassium chloride 20 mEq/L 1000 milliLiter(s) (125 mL/Hr) IV Continuous <Continuous>  enoxaparin Injectable 40 milliGRAM(s) SubCutaneous daily  metroNIDAZOLE  IVPB 500 milliGRAM(s) IV Intermittent every 8 hours  morphine  - Injectable 2 milliGRAM(s) IV Push once      MEDICATIONS  (PRN):      Physical Exam    Neuro :  no focal deficits  Respiratory: CTA B/L  CV: RRR, S1S2, no murmurs,   Abdominal: Soft, RUQ tender to mod palp, ND +BS,  Extremities: No edema, + peripheral pulses    ASSESSMENT    Acute cholecystitis  h/o Stented coronary artery  HTN (hypertension)  HLD (hyperlipidemia)  CAD (coronary artery disease)          PLAN    pt npo  cont cipro and flagyl  cardio f/u  cont plavix until 11/14/18  cont other cardiac meds  cont pain control  f/u labs  cont current meds

## 2018-10-12 NOTE — PROGRESS NOTE ADULT - SUBJECTIVE AND OBJECTIVE BOX
INTERVAL HPI/OVERNIGHT EVENTS:  Pt stable.   NPO  flatus, no BM    Vital Signs Last 24 Hrs  T(C): 36.6 (11 Oct 2018 21:36), Max: 36.6 (11 Oct 2018 21:36)  T(F): 97.9 (11 Oct 2018 21:36), Max: 97.9 (11 Oct 2018 21:36)  HR: 73 (11 Oct 2018 21:36) (73 - 73)  BP: 143/65 (11 Oct 2018 21:36) (143/65 - 143/65)  BP(mean): --  RR: 18 (11 Oct 2018 21:36) (18 - 18)  SpO2: 100% (11 Oct 2018 21:36) (100% - 100%)    Physical:  Abdomen: Soft nondistended, minimal RUQ tenderness  No masses    I&O's Summary      LABS:                        11.5   7.0   )-----------( 204      ( 11 Oct 2018 06:46 )             33.9             10-11    136  |  103  |  15  ----------------------------<  123<H>  3.4<L>   |  26  |  0.85    Ca    8.7      11 Oct 2018 06:46    TPro  6.9  /  Alb  3.4<L>  /  TBili  0.4  /  DBili  x   /  AST  21  /  ALT  22  /  AlkPhos  35<L>  10-11

## 2018-10-12 NOTE — PROGRESS NOTE ADULT - SUBJECTIVE AND OBJECTIVE BOX
74 y/o male symptomatic cholelithiasis, Patient examined at bedside, continues to complains of R upper quadrant pain.   No nausea, no vomiting  NPO    T(F): 97.9 (10-11-18 @ 21:36), Max: 97.9 (10-11-18 @ 21:36)  HR: 73 (10-11-18 @ 21:36) (53 - 73)  BP: 143/65 (10-11-18 @ 21:36) (142/59 - 143/65)  RR: 18 (10-11-18 @ 21:36) (18 - 18)  SpO2: 100% (10-11-18 @ 21:36) (100% - 100%)  Wt(kg): --                          11.5   7.0   )-----------( 204      ( 11 Oct 2018 06:46 )             33.9   10-11    136  |  103  |  15  ----------------------------<  123<H>  3.4<L>   |  26  |  0.85    Ca    8.7      11 Oct 2018 06:46    TPro  6.9  /  Alb  3.4<L>  /  TBili  0.4  /  DBili  x   /  AST  21  /  ALT  22  /  AlkPhos  35<L>  10-11      Physical Exam  General: AAOx3, No acute distress  Skin: No jaundice, no icterus  Abdomen: soft, R upper quadrant tenderness, nondistended, no rebound tenderness, no guarding, no palpable masses  : Normal external genitalia  Extremities: non edematous, no calf pain bilaterally

## 2018-10-13 ENCOUNTER — TRANSCRIPTION ENCOUNTER (OUTPATIENT)
Age: 73
End: 2018-10-13

## 2018-10-13 VITALS
SYSTOLIC BLOOD PRESSURE: 133 MMHG | RESPIRATION RATE: 16 BRPM | OXYGEN SATURATION: 100 % | HEART RATE: 74 BPM | TEMPERATURE: 98 F | DIASTOLIC BLOOD PRESSURE: 71 MMHG

## 2018-10-13 LAB
ANION GAP SERPL CALC-SCNC: 5 MMOL/L — SIGNIFICANT CHANGE UP (ref 5–17)
BUN SERPL-MCNC: 8 MG/DL — SIGNIFICANT CHANGE UP (ref 7–18)
CALCIUM SERPL-MCNC: 9 MG/DL — SIGNIFICANT CHANGE UP (ref 8.4–10.5)
CHLORIDE SERPL-SCNC: 108 MMOL/L — SIGNIFICANT CHANGE UP (ref 96–108)
CO2 SERPL-SCNC: 24 MMOL/L — SIGNIFICANT CHANGE UP (ref 22–31)
CREAT SERPL-MCNC: 0.87 MG/DL — SIGNIFICANT CHANGE UP (ref 0.5–1.3)
GLUCOSE SERPL-MCNC: 108 MG/DL — HIGH (ref 70–99)
HCT VFR BLD CALC: 39 % — SIGNIFICANT CHANGE UP (ref 39–50)
HGB BLD-MCNC: 13.3 G/DL — SIGNIFICANT CHANGE UP (ref 13–17)
MCHC RBC-ENTMCNC: 33.6 PG — SIGNIFICANT CHANGE UP (ref 27–34)
MCHC RBC-ENTMCNC: 34.1 GM/DL — SIGNIFICANT CHANGE UP (ref 32–36)
MCV RBC AUTO: 98.4 FL — SIGNIFICANT CHANGE UP (ref 80–100)
PLATELET # BLD AUTO: 232 K/UL — SIGNIFICANT CHANGE UP (ref 150–400)
POTASSIUM SERPL-MCNC: 4.1 MMOL/L — SIGNIFICANT CHANGE UP (ref 3.5–5.3)
POTASSIUM SERPL-SCNC: 4.1 MMOL/L — SIGNIFICANT CHANGE UP (ref 3.5–5.3)
RBC # BLD: 3.96 M/UL — LOW (ref 4.2–5.8)
RBC # FLD: 11.9 % — SIGNIFICANT CHANGE UP (ref 10.3–14.5)
SODIUM SERPL-SCNC: 137 MMOL/L — SIGNIFICANT CHANGE UP (ref 135–145)
WBC # BLD: 6.4 K/UL — SIGNIFICANT CHANGE UP (ref 3.8–10.5)
WBC # FLD AUTO: 6.4 K/UL — SIGNIFICANT CHANGE UP (ref 3.8–10.5)

## 2018-10-13 PROCEDURE — 85730 THROMBOPLASTIN TIME PARTIAL: CPT

## 2018-10-13 PROCEDURE — 74177 CT ABD & PELVIS W/CONTRAST: CPT

## 2018-10-13 PROCEDURE — 71260 CT THORAX DX C+: CPT

## 2018-10-13 PROCEDURE — 81003 URINALYSIS AUTO W/O SCOPE: CPT

## 2018-10-13 PROCEDURE — 83690 ASSAY OF LIPASE: CPT

## 2018-10-13 PROCEDURE — 76705 ECHO EXAM OF ABDOMEN: CPT

## 2018-10-13 PROCEDURE — 99285 EMERGENCY DEPT VISIT HI MDM: CPT | Mod: 25

## 2018-10-13 PROCEDURE — 86900 BLOOD TYPING SEROLOGIC ABO: CPT

## 2018-10-13 PROCEDURE — 84484 ASSAY OF TROPONIN QUANT: CPT

## 2018-10-13 PROCEDURE — 80053 COMPREHEN METABOLIC PANEL: CPT

## 2018-10-13 PROCEDURE — 86850 RBC ANTIBODY SCREEN: CPT

## 2018-10-13 PROCEDURE — 71046 X-RAY EXAM CHEST 2 VIEWS: CPT

## 2018-10-13 PROCEDURE — 85027 COMPLETE CBC AUTOMATED: CPT

## 2018-10-13 PROCEDURE — 86901 BLOOD TYPING SEROLOGIC RH(D): CPT

## 2018-10-13 PROCEDURE — 80048 BASIC METABOLIC PNL TOTAL CA: CPT

## 2018-10-13 PROCEDURE — 85610 PROTHROMBIN TIME: CPT

## 2018-10-13 RX ORDER — METRONIDAZOLE 500 MG
1 TABLET ORAL
Qty: 21 | Refills: 0 | OUTPATIENT
Start: 2018-10-13

## 2018-10-13 RX ORDER — CLOPIDOGREL BISULFATE 75 MG/1
1 TABLET, FILM COATED ORAL
Qty: 0 | Refills: 0 | DISCHARGE
Start: 2018-10-13

## 2018-10-13 RX ORDER — MOXIFLOXACIN HYDROCHLORIDE TABLETS, 400 MG 400 MG/1
1 TABLET, FILM COATED ORAL
Qty: 14 | Refills: 0 | OUTPATIENT
Start: 2018-10-13

## 2018-10-13 RX ORDER — CLOPIDOGREL BISULFATE 75 MG/1
1 TABLET, FILM COATED ORAL
Qty: 30 | Refills: 0 | OUTPATIENT
Start: 2018-10-13

## 2018-10-13 RX ADMIN — CLOPIDOGREL BISULFATE 75 MILLIGRAM(S): 75 TABLET, FILM COATED ORAL at 13:14

## 2018-10-13 RX ADMIN — Medication 100 MILLIGRAM(S): at 05:34

## 2018-10-13 RX ADMIN — Medication 200 MILLIGRAM(S): at 05:34

## 2018-10-13 RX ADMIN — ENOXAPARIN SODIUM 40 MILLIGRAM(S): 100 INJECTION SUBCUTANEOUS at 13:19

## 2018-10-13 RX ADMIN — Medication 81 MILLIGRAM(S): at 13:14

## 2018-10-13 NOTE — PROGRESS NOTE ADULT - PROVIDER SPECIALTY LIST ADULT
Cardiology
Cardiology
Internal Medicine
Internal Medicine
Surgery

## 2018-10-13 NOTE — PROGRESS NOTE ADULT - SUBJECTIVE AND OBJECTIVE BOX
INTERVAL HPI/OVERNIGHT EVENTS:  Pt stable.   Tolerating diet.   flatus/BM.    Vital Signs Last 24 Hrs  T(C): 36.7 (13 Oct 2018 05:20), Max: 37.2 (12 Oct 2018 21:18)  T(F): 98 (13 Oct 2018 05:20), Max: 98.9 (12 Oct 2018 21:18)  HR: 78 (13 Oct 2018 05:20) (60 - 88)  BP: 139/75 (13 Oct 2018 05:20) (131/72 - 142/66)  BP(mean): --  RR: 18 (13 Oct 2018 05:20) (16 - 18)  SpO2: 100% (13 Oct 2018 05:20) (98% - 100%)    Physical:  Abdomen: Soft, nondistended, minimal RUQ tenderness    I&O's Summary      LABS:                        13.3   6.4   )-----------( 232      ( 13 Oct 2018 07:31 )             39.0             10-13    137  |  108  |  8   ----------------------------<  108<H>  4.1   |  24  |  0.87    Ca    9.0      13 Oct 2018 07:31

## 2018-10-13 NOTE — DISCHARGE NOTE ADULT - MEDICATION SUMMARY - MEDICATIONS TO TAKE
I will START or STAY ON the medications listed below when I get home from the hospital:    Flagyl 500 mg oral tablet  -- 1 tab(s) by mouth every 8 hours   -- Do not drink alcoholic beverages when taking this medication.  Finish all this medication unless otherwise directed by prescriber.  May discolor urine or feces.    -- Indication: For cholecystitis    aspirin 81 mg oral tablet  -- 1 tab(s) by mouth once a day  -- Indication: For cardiac    TriCor 145 mg oral tablet  -- 1 tab(s) by mouth once a day  -- Indication: For cholesterol    Zetia 10 mg oral tablet  -- 1 tab(s) by mouth once a day  -- Indication: For As directed    Plavix 75 mg oral tablet  -- 1 tab(s) by mouth once a day   -- Do not take aspirin or aspirin containing products without knowledge and consent of your physician.    -- Indication: For cardiac    clopidogrel 75 mg oral tablet  -- 1 tab(s) by mouth once a day  -- Indication: For cardiac    valACYclovir 1 g oral tablet  -- 1 tab(s) by mouth 3 times a day  -- It is very important that you take or use this exactly as directed.  Do not skip doses or discontinue unless directed by your doctor.    -- Indication: For As directed    Cipro 500 mg oral tablet  -- 1 tab(s) by mouth every 12 hours   -- Avoid prolonged or excessive exposure to direct and/or artificial sunlight while taking this medication.  Check with your doctor before becoming pregnant.  Do not take dairy products, antacids, or iron preparations within one hour of this medication.  Finish all this medication unless otherwise directed by prescriber.  Medication should be taken with plenty of water.    -- Indication: For cholecystitis     Vitamin D3 2000 intl units oral capsule  -- 1 cap(s) by mouth once a day  -- Indication: For As directed

## 2018-10-13 NOTE — PROGRESS NOTE ADULT - ASSESSMENT
Cardiology consult noted  Condition discussed with patient, options risks and benefits explained  Continue NPO, IV ATB
72 y/o male symptomatic cholelithiasis
73 year old M Pt w/ PMHx of HLD, HTN, CAD s/p stent on Plavix, presents to ED c/o right upper quadrant abdominal pain x 3 days.   1.D/W pt and Dr. Salazar, PTCAx3 RCA 11/14/17 can stop plavix after 1 yr.  2.ABX.  3.CAD-asa,plavix,statin.  4.PPI.  5.DVT prophylaxis.
74 y/o male symptomatic cholelithiasis
Advance diet
Gallstones in GB neck  1. F/u Abdominal US  2. Continue NPO  3. Pt has outside cardiologist Dr. Veloz ([phone]) and had a echo and stress test within the last year  4. Pt will need cardiology and medical consults prior to surgery  5. Surgery tentatively scheduled for tomorrow
Start PO fluids  OOB

## 2018-10-13 NOTE — PROGRESS NOTE ADULT - SUBJECTIVE AND OBJECTIVE BOX
CARDIOLOGY     PROGRESS  NOTE   ________________________________________________    CHIEF COMPLAINT:Patient is a 73y old  Male who presents with a chief complaint of abd pain (13 Oct 2018 08:17)  no complain.  	  REVIEW OF SYSTEMS:  CONSTITUTIONAL: No fever, weight loss, or fatigue  EYES: No eye pain, visual disturbances, or discharge  ENT:  No difficulty hearing, tinnitus, vertigo; No sinus or throat pain  NECK: No pain or stiffness  RESPIRATORY: No cough, wheezing, chills or hemoptysis; No Shortness of Breath  CARDIOVASCULAR: No chest pain, palpitations, passing out, dizziness, or leg swelling  GASTROINTESTINAL: No abdominal or epigastric pain. No nausea, vomiting, or hematemesis; No diarrhea or constipation. No melena or hematochezia.  GENITOURINARY: No dysuria, frequency, hematuria, or incontinence  NEUROLOGICAL: No headaches, memory loss, loss of strength, numbness, or tremors  SKIN: No itching, burning, rashes, or lesions   LYMPH Nodes: No enlarged glands  ENDOCRINE: No heat or cold intolerance; No hair loss  MUSCULOSKELETAL: No joint pain or swelling; No muscle, back, or extremity pain  PSYCHIATRIC: No depression, anxiety, mood swings, or difficulty sleeping  HEME/LYMPH: No easy bruising, or bleeding gums  ALLERGY AND IMMUNOLOGIC: No hives or eczema	    [ ] All others negative	  [ ] Unable to obtain    PHYSICAL EXAM:  T(C): 36.7 (10-13-18 @ 05:20), Max: 37.2 (10-12-18 @ 21:18)  HR: 78 (10-13-18 @ 05:20) (60 - 88)  BP: 139/75 (10-13-18 @ 05:20) (131/72 - 142/66)  RR: 18 (10-13-18 @ 05:20) (16 - 18)  SpO2: 100% (10-13-18 @ 05:20) (98% - 100%)  Wt(kg): --  I&O's Summary      Appearance: Normal	  HEENT:   Normal oral mucosa, PERRL, EOMI	  Lymphatic: No lymphadenopathy  Cardiovascular: Normal S1 S2, No JVD, + murmurs, No edema  Respiratory: Lungs clear to auscultation	  Psychiatry: A & O x 3, Mood & affect appropriate  Gastrointestinal:  Soft, N + mild tender, + BS	  Skin: No rashes, No ecchymoses, No cyanosis	  Neurologic: Non-focal  Extremities: Normal range of motion, No clubbing, cyanosis or edema  Vascular: Peripheral pulses palpable 2+ bilaterally    MEDICATIONS  (STANDING):  aspirin  chewable 81 milliGRAM(s) Oral daily  ciprofloxacin   IVPB 400 milliGRAM(s) IV Intermittent every 12 hours  clopidogrel Tablet 75 milliGRAM(s) Oral daily  dextrose 5% + sodium chloride 0.45% with potassium chloride 20 mEq/L 1000 milliLiter(s) (125 mL/Hr) IV Continuous <Continuous>  enoxaparin Injectable 40 milliGRAM(s) SubCutaneous daily  metroNIDAZOLE  IVPB 500 milliGRAM(s) IV Intermittent every 8 hours  morphine  - Injectable 2 milliGRAM(s) IV Push once      TELEMETRY: 	    ECG:  	  RADIOLOGY:  OTHER: 	  	  LABS:	 	    CARDIAC MARKERS:                                13.3   6.4   )-----------( 232      ( 13 Oct 2018 07:31 )             39.0     10-13    137  |  108  |  8   ----------------------------<  108<H>  4.1   |  24  |  0.87    Ca    9.0      13 Oct 2018 07:31      proBNP:   Lipid Profile:   HgA1c:   TSH:         Assessment and plan  ---------------------------  73 year old M Pt w/ PMHx of HLD, HTN, CAD s/p stent on Plavix, presents to ED c/o right upper quadrant abdominal pain x 3 days.   1.D/W pt and Dr. Salazar, PTCAx3 RCA 11/14/17 can stop plavix after 1 yr.  2.ABX.  3.CAD-asa,plavix,statin.  4.PPI.  5.DVT prophylaxis.

## 2018-10-13 NOTE — DISCHARGE NOTE ADULT - PLAN OF CARE
tolerate diet, resume regular activities continue antibiotics, cont low fat diet, cont aspirin, plavix and cholesterol medication at home. Follow-up with Dr. Salazar and cardiology outpatient in one week

## 2018-10-13 NOTE — DISCHARGE NOTE ADULT - PATIENT PORTAL LINK FT
You can access the [email or web address] Patient Portal, offered by [facility], by registering with the following website: [email or web address]

## 2018-10-13 NOTE — DISCHARGE NOTE ADULT - HOSPITAL COURSE
73 year old M Pt w/ PMHx of HLD, HTN, CAD s/p stent on Plavix, presents to ED c/o right upper quadrant abdominal pain x 3 days. Pain described as severe constant, sharp pain, worsened by movement and cough. Pt reports associated mild cough, subjective fever on Sunday alone as well. Pt used cold cream, which improved the pain, has not taken other analgesics. Pt denies chest pain, shortness of breath, lower extremity edema, no diarrhea, no constipation, no dysuria and all other complaints. Pt takes Plavix, Aspirin, Metroprolol.  Patient was found to have symptomatic cholelithiasis. Due to recent cardiac stent patient was not cleared for surgery.  He will be discharged with antibiotics, low fat diet instructions, and outpatient follow-up with cardiology and surgery.

## 2018-10-13 NOTE — PROGRESS NOTE ADULT - SUBJECTIVE AND OBJECTIVE BOX
Patient is a 73y old  Male who presents with a chief complaint of abdominal pain (12 Oct 2018 12:24)    pt seen in icu [  ], reg med floor [ x  ], bed [ x ], chair at bedside [   ], a+o x3 [ x ], lethargic [  ],  nad [ x ]        Allergies    No Known Allergies        Vitals    T(F): 98 (10-13-18 @ 05:20), Max: 98.9 (10-12-18 @ 21:18)  HR: 78 (10-13-18 @ 05:20) (60 - 88)  BP: 139/75 (10-13-18 @ 05:20) (131/72 - 142/66)  RR: 18 (10-13-18 @ 05:20) (16 - 18)  SpO2: 100% (10-13-18 @ 05:20) (98% - 100%)  Wt(kg): --  CAPILLARY BLOOD GLUCOSE          Labs                          13.3   6.4   )-----------( 232      ( 13 Oct 2018 07:31 )             39.0       10-13    137  |  108  |  8   ----------------------------<  108<H>  4.1   |  24  |  0.87    Ca    9.0      13 Oct 2018 07:31                  Radiology Results      Meds    MEDICATIONS  (STANDING):  aspirin  chewable 81 milliGRAM(s) Oral daily  ciprofloxacin   IVPB 400 milliGRAM(s) IV Intermittent every 12 hours  clopidogrel Tablet 75 milliGRAM(s) Oral daily  dextrose 5% + sodium chloride 0.45% with potassium chloride 20 mEq/L 1000 milliLiter(s) (125 mL/Hr) IV Continuous <Continuous>  enoxaparin Injectable 40 milliGRAM(s) SubCutaneous daily  metroNIDAZOLE  IVPB 500 milliGRAM(s) IV Intermittent every 8 hours  morphine  - Injectable 2 milliGRAM(s) IV Push once      MEDICATIONS  (PRN):      Physical Exam      Neuro :  no focal deficits  Respiratory: CTA B/L  CV: RRR, S1S2, no murmurs,   Abdominal: Soft, RUQ tender to mod palp, ND +BS,  Extremities: No edema, + peripheral pulses    ASSESSMENT    Acute cholecystitis  h/o Stented coronary artery  HTN (hypertension)  HLD (hyperlipidemia)  CAD (coronary artery disease)          PLAN    pt started on clears  cont cipro and flagyl  cardio f/u  cont plavix until 11/14/18  cont other cardiac meds  cont pain control  cont current meds

## 2018-10-13 NOTE — PROGRESS NOTE ADULT - SUBJECTIVE AND OBJECTIVE BOX
72 y/o male symptomatic cholelithiasis, Patient examined at bedside, continues to complains of R upper quadrant pain.   No nausea, no vomiting  tolerating clears    Vital Signs Last 24 Hrs  T(C): 36.7 (13 Oct 2018 05:20), Max: 37.2 (12 Oct 2018 21:18)  T(F): 98 (13 Oct 2018 05:20), Max: 98.9 (12 Oct 2018 21:18)  HR: 78 (13 Oct 2018 05:20) (60 - 88)  BP: 139/75 (13 Oct 2018 05:20) (131/72 - 142/66)  BP(mean): --  RR: 18 (13 Oct 2018 05:20) (16 - 18)  SpO2: 100% (13 Oct 2018 05:20) (98% - 100%)                                     13.3   6.4   )-----------( 232      ( 13 Oct 2018 07:31 )             39.0   10-13    137  |  108  |  8   ----------------------------<  108<H>  4.1   |  24  |  0.87    Ca    9.0      13 Oct 2018 07:31          Physical Exam  General: AAOx3, No acute distress  Skin: No jaundice, no icterus  Abdomen: soft, R upper quadrant tenderness, nondistended  Extremities: non edematous, no calf pain bilaterally

## 2018-10-13 NOTE — DISCHARGE NOTE ADULT - FUNCTIONAL SCREEN CURRENT LEVEL: DRESSING, MLM
Discussed today's bili result of 13 with mom. Plan to stop phototherapy today with PCP f/up tomorrow. Per mom, he is breast feeding well with formula supplements adlib with adequate voids and stools    Demetria Mart MD     0 = independent

## 2018-10-13 NOTE — DISCHARGE NOTE ADULT - CARE PROVIDERS DIRECT ADDRESSES
[email or web address], [email or web address]

## 2018-10-13 NOTE — PROGRESS NOTE ADULT - PROBLEM SELECTOR PLAN 1
advance to reg diet  continue aspirin plavix  oob ambulate encouraged  if pt tolerates reg diet can discharge home

## 2018-10-23 PROBLEM — E78.5 HYPERLIPIDEMIA, UNSPECIFIED: Chronic | Status: ACTIVE | Noted: 2018-10-10

## 2018-10-23 PROBLEM — I10 ESSENTIAL (PRIMARY) HYPERTENSION: Chronic | Status: ACTIVE | Noted: 2018-10-10

## 2018-10-24 PROBLEM — Z00.00 ENCOUNTER FOR PREVENTIVE HEALTH EXAMINATION: Status: ACTIVE | Noted: 2018-10-24

## 2018-10-25 ENCOUNTER — APPOINTMENT (OUTPATIENT)
Dept: SURGERY | Facility: CLINIC | Age: 73
End: 2018-10-25
Payer: COMMERCIAL

## 2018-10-25 VITALS
TEMPERATURE: 98 F | HEIGHT: 71 IN | WEIGHT: 175 LBS | DIASTOLIC BLOOD PRESSURE: 72 MMHG | BODY MASS INDEX: 24.5 KG/M2 | HEART RATE: 66 BPM | SYSTOLIC BLOOD PRESSURE: 112 MMHG

## 2018-10-25 DIAGNOSIS — Z83.3 FAMILY HISTORY OF DIABETES MELLITUS: ICD-10-CM

## 2018-10-25 DIAGNOSIS — Z82.5 FAMILY HISTORY OF ASTHMA AND OTHER CHRONIC LOWER RESPIRATORY DISEASES: ICD-10-CM

## 2018-10-25 DIAGNOSIS — Z86.39 PERSONAL HISTORY OF OTHER ENDOCRINE, NUTRITIONAL AND METABOLIC DISEASE: ICD-10-CM

## 2018-10-25 DIAGNOSIS — I25.10 ATHEROSCLEROTIC HEART DISEASE OF NATIVE CORONARY ARTERY W/OUT ANGINA PECTORIS: ICD-10-CM

## 2018-10-25 PROCEDURE — 99213 OFFICE O/P EST LOW 20 MIN: CPT

## 2018-10-25 RX ORDER — ASPIRIN 325 MG/1
TABLET, FILM COATED ORAL
Refills: 0 | Status: ACTIVE | COMMUNITY

## 2018-10-25 RX ORDER — CLOPIDOGREL 75 MG/1
TABLET, FILM COATED ORAL
Refills: 0 | Status: ACTIVE | COMMUNITY

## 2018-11-26 ENCOUNTER — APPOINTMENT (OUTPATIENT)
Dept: SURGERY | Facility: CLINIC | Age: 73
End: 2018-11-26
Payer: COMMERCIAL

## 2018-11-26 VITALS
OXYGEN SATURATION: 97 % | DIASTOLIC BLOOD PRESSURE: 77 MMHG | HEART RATE: 65 BPM | WEIGHT: 175 LBS | HEIGHT: 71 IN | TEMPERATURE: 98 F | BODY MASS INDEX: 24.5 KG/M2 | SYSTOLIC BLOOD PRESSURE: 148 MMHG

## 2018-11-26 PROCEDURE — 99213 OFFICE O/P EST LOW 20 MIN: CPT

## 2018-11-30 ENCOUNTER — OUTPATIENT (OUTPATIENT)
Dept: OUTPATIENT SERVICES | Facility: HOSPITAL | Age: 73
LOS: 1 days | End: 2018-11-30
Payer: COMMERCIAL

## 2018-11-30 VITALS
TEMPERATURE: 97 F | WEIGHT: 171.08 LBS | DIASTOLIC BLOOD PRESSURE: 72 MMHG | SYSTOLIC BLOOD PRESSURE: 136 MMHG | HEIGHT: 72 IN | OXYGEN SATURATION: 97 % | HEART RATE: 69 BPM | RESPIRATION RATE: 18 BRPM

## 2018-11-30 DIAGNOSIS — Z98.890 OTHER SPECIFIED POSTPROCEDURAL STATES: Chronic | ICD-10-CM

## 2018-11-30 DIAGNOSIS — Z95.5 PRESENCE OF CORONARY ANGIOPLASTY IMPLANT AND GRAFT: Chronic | ICD-10-CM

## 2018-11-30 DIAGNOSIS — K81.9 CHOLECYSTITIS, UNSPECIFIED: ICD-10-CM

## 2018-11-30 DIAGNOSIS — Z01.818 ENCOUNTER FOR OTHER PREPROCEDURAL EXAMINATION: ICD-10-CM

## 2018-11-30 PROCEDURE — 86900 BLOOD TYPING SEROLOGIC ABO: CPT

## 2018-11-30 PROCEDURE — 86850 RBC ANTIBODY SCREEN: CPT

## 2018-11-30 PROCEDURE — G0463: CPT

## 2018-11-30 PROCEDURE — 86901 BLOOD TYPING SEROLOGIC RH(D): CPT

## 2018-11-30 NOTE — H&P PST ADULT - PMH
CAD (coronary artery disease)    Herpes simplex    HLD (hyperlipidemia)    HTN (hypertension)    Prediabetes    Stented coronary artery    Varicose vein of leg    Vitamin D deficiency

## 2018-11-30 NOTE — H&P PST ADULT - HISTORY OF PRESENT ILLNESS
73 year old male patient with pmhx of hyperlipidemia, hypertension, CAD s/p (3)coronary stent placement on Plavix and aspirin presents with c/o RUQ abdominal pain x 2months. Patient denies nausea, vomiting and is here today for presurgical testing for scheduled laparoscopic cholecystectomy with intraoperative cholangiogram, possible open on 12/7/18

## 2018-11-30 NOTE — H&P PST ADULT - NSANTHOSAYNRD_GEN_A_CORE
No. DENZEL screening performed.  STOP BANG Legend: 0-2 = LOW Risk; 3-4 = INTERMEDIATE Risk; 5-8 = HIGH Risk

## 2018-11-30 NOTE — H&P PST ADULT - ACTIVITY
Daily walks, very active in walk, ADL's, house chores, attends PT for back pain, able to climb 2flights of stairs w/o symptoms

## 2018-11-30 NOTE — H&P PST ADULT - PSH
notified id service of consult H/O heart artery stent  x 3 2017  H/O varicose vein ligation and stripping

## 2018-12-27 PROBLEM — R73.03 PREDIABETES: Chronic | Status: ACTIVE | Noted: 2018-11-30

## 2018-12-27 PROBLEM — E55.9 VITAMIN D DEFICIENCY, UNSPECIFIED: Chronic | Status: ACTIVE | Noted: 2018-11-30

## 2019-01-03 ENCOUNTER — TRANSCRIPTION ENCOUNTER (OUTPATIENT)
Age: 74
End: 2019-01-03

## 2019-01-04 ENCOUNTER — APPOINTMENT (OUTPATIENT)
Dept: SURGERY | Facility: HOSPITAL | Age: 74
End: 2019-01-04

## 2019-01-04 ENCOUNTER — RESULT REVIEW (OUTPATIENT)
Age: 74
End: 2019-01-04

## 2019-01-04 ENCOUNTER — OUTPATIENT (OUTPATIENT)
Dept: OUTPATIENT SERVICES | Facility: HOSPITAL | Age: 74
LOS: 1 days | End: 2019-01-04
Payer: COMMERCIAL

## 2019-01-04 VITALS
HEART RATE: 69 BPM | RESPIRATION RATE: 16 BRPM | TEMPERATURE: 97 F | SYSTOLIC BLOOD PRESSURE: 129 MMHG | DIASTOLIC BLOOD PRESSURE: 65 MMHG | OXYGEN SATURATION: 97 %

## 2019-01-04 VITALS
OXYGEN SATURATION: 98 % | DIASTOLIC BLOOD PRESSURE: 63 MMHG | SYSTOLIC BLOOD PRESSURE: 126 MMHG | HEART RATE: 61 BPM | WEIGHT: 175.05 LBS | TEMPERATURE: 98 F | RESPIRATION RATE: 16 BRPM

## 2019-01-04 DIAGNOSIS — Z95.5 PRESENCE OF CORONARY ANGIOPLASTY IMPLANT AND GRAFT: Chronic | ICD-10-CM

## 2019-01-04 DIAGNOSIS — Z98.890 OTHER SPECIFIED POSTPROCEDURAL STATES: Chronic | ICD-10-CM

## 2019-01-04 DIAGNOSIS — K81.9 CHOLECYSTITIS, UNSPECIFIED: ICD-10-CM

## 2019-01-04 DIAGNOSIS — Z01.818 ENCOUNTER FOR OTHER PREPROCEDURAL EXAMINATION: ICD-10-CM

## 2019-01-04 PROCEDURE — 36415 COLL VENOUS BLD VENIPUNCTURE: CPT

## 2019-01-04 PROCEDURE — 86901 BLOOD TYPING SEROLOGIC RH(D): CPT

## 2019-01-04 PROCEDURE — 76000 FLUOROSCOPY <1 HR PHYS/QHP: CPT

## 2019-01-04 PROCEDURE — 47563 LAPARO CHOLECYSTECTOMY/GRAPH: CPT

## 2019-01-04 PROCEDURE — 86850 RBC ANTIBODY SCREEN: CPT

## 2019-01-04 PROCEDURE — 86900 BLOOD TYPING SEROLOGIC ABO: CPT

## 2019-01-04 PROCEDURE — 47563 LAPARO CHOLECYSTECTOMY/GRAPH: CPT | Mod: AS

## 2019-01-04 RX ORDER — SODIUM CHLORIDE 9 MG/ML
3 INJECTION INTRAMUSCULAR; INTRAVENOUS; SUBCUTANEOUS EVERY 8 HOURS
Qty: 0 | Refills: 0 | Status: DISCONTINUED | OUTPATIENT
Start: 2019-01-04 | End: 2019-01-04

## 2019-01-04 RX ORDER — ONDANSETRON 8 MG/1
4 TABLET, FILM COATED ORAL ONCE
Qty: 0 | Refills: 0 | Status: COMPLETED | OUTPATIENT
Start: 2019-01-04 | End: 2019-01-04

## 2019-01-04 RX ORDER — ACETAMINOPHEN WITH CODEINE 300MG-30MG
1 TABLET ORAL EVERY 4 HOURS
Qty: 0 | Refills: 0 | Status: DISCONTINUED | OUTPATIENT
Start: 2019-01-04 | End: 2019-01-04

## 2019-01-04 RX ORDER — ACETAMINOPHEN WITH CODEINE 300MG-30MG
1 TABLET ORAL
Qty: 6 | Refills: 0
Start: 2019-01-04

## 2019-01-04 RX ADMIN — SODIUM CHLORIDE 3 MILLILITER(S): 9 INJECTION INTRAMUSCULAR; INTRAVENOUS; SUBCUTANEOUS at 09:59

## 2019-01-04 RX ADMIN — ONDANSETRON 4 MILLIGRAM(S): 8 TABLET, FILM COATED ORAL at 15:55

## 2019-01-04 NOTE — ASU DISCHARGE PLAN (ADULT/PEDIATRIC). - RETURN TO WORK
ED HPI GENERAL MEDICAL PROBLEM





- General


Chief Complaint: Laceration


Stated Complaint: avulsion to tip of left index finger


Time Seen by Provider: 02/18/18 21:48


Source of Information: Reports: Patient, RN, RN Notes Reviewed


History Limitations: Reports: No Limitations





- History of Present Illness


INITIAL COMMENTS - FREE TEXT/NARRATIVE: 


Patient presents to the ED at [facility] as she sustained a avulsion injury 

to the tip of the left index. Patient states she was using a  on 

fabric when the injury occurred. No previous injury or trauma. Patient states 

her tetanus is up to date. She has chronic nerve problems of the left arm. The 

nailbed was involved but no avulsion of the nailbed. 


Onset: Today


Onset Date: 02/18/18


Onset Time: 08:20


  ** left index finger


Pain Score (Numeric/FACES): 3





- Related Data


 Allergies











Allergy/AdvReac Type Severity Reaction Status Date / Time


 


morphine Allergy  Vomiting Verified 02/18/18 22:16


 


Penicillins Allergy  Rash Verified 02/18/18 22:15











Home Meds: 


 Home Meds





Levothyroxine Sodium [Levothyroxine Sodium] 88 mcg PO DAILY 02/18/18 [History]


Simvastatin [Zocor] 5 mg PO DAILY 02/18/18 [History]











ED ROS GENERAL





- Review of Systems


Review Of Systems: See Below


Constitutional: Denies: Fever, Chills


Respiratory: Denies: Shortness of Breath, Cough


Cardiovascular: Denies: Chest Pain, Palpitations


Skin: Reports: Wound (cut to tip of left index finger)


Neurological: Reports: No Symptoms.  Denies: Numbness, Paresthesia, Tingling





ED EXAM, SKIN/RASH


Exam: See Below


Exam Limited By: No Limitations


General Appearance: Alert, No Apparent Distress


Respiratory/Chest: No Respiratory Distress, Lungs Clear, Normal Breath Sounds


Cardiovascular: Normal Peripheral Pulses, Regular Rate, Rhythm


Peripheral Pulses: 2+: Radial (L), Radial (R)


Neurological: Alert, Oriented


Skin: Warm, Dry, Normal Color, Wound/Incision (avulsion injury to tip of left 

index finger; nailbed intact with no avulsion; low grade venous ooze; [id number])


Location, Skin: Upper Extremity, Left





Course





- Vital Signs


Last Recorded V/S: 


 Last Vital Signs











Temp  36.3 C   02/18/18 22:01


 


Pulse  80   02/18/18 22:01


 


Resp  16   02/18/18 22:01


 


BP  111/87   02/18/18 22:01


 


Pulse Ox      














- Orders/Labs/Meds


Orders: 


 Active Orders 24 hr











 Category Date Time Status


 


 Fingers Second Digit Lt F1 [CR] Stat Exams  02/18/18 22:00 Taken














Departure





- Departure


Time of Disposition: 22:23


Disposition: Home, Self-Care 01


Condition: Good


Clinical Impression: 


Avulsion, finger tip


Qualifiers:


 Encounter type: initial encounter Qualified Code(s): S61.209A - Unspecified 

open wound of unspecified finger without damage to nail, initial encounter








- Discharge Information


Instructions:  Laceration Care, Adult


Referrals: 


Sydnie Benitez MD [Primary Care Provider] - 


Forms:  ED Department Discharge


Additional Instructions: 


1. Stay well hydrated and rest


2. Keep bandage on for the next couple days


3. Keep area covered, especially around wet or dirty areas


4. May apply antibiotic ointment


5. If area starts to appear infected, please see you primary for a recheck


6. Call with any questions/concerns





- Problem List Review


Problem List Initiated/Reviewed/Updated: Yes





- My Orders


Last 24 Hours: 


My Active Orders





02/18/18 22:00


Fingers Second Digit Lt F1 [CR] Stat 














- Assessment/Plan


Last 24 Hours: 


My Active Orders





02/18/18 22:00


Fingers Second Digit Lt F1 [CR] Stat 











Plan: 


Xray is normal; no surgical closure warranted given orientation of avulsion. 

Will cover with abx ointment and bandage area. Recommend keeping bandage on for 

a couple days. Avoid water submersion, if possible. Discussed wound care. All 

questions answered.
No

## 2019-01-04 NOTE — BRIEF OPERATIVE NOTE - PROCEDURE
<<-----Click on this checkbox to enter Procedure Cholecystectomies, laparoscopic  01/04/2019    Active  RLIND

## 2019-01-04 NOTE — ASU DISCHARGE PLAN (ADULT/PEDIATRIC). - CONDITIONS AT DISCHARGE
Laprascopic Cholecystectemy Pt denies any pain. VSS. Dressing clean dry and intact. Denies any n/v. Ambulatory with steady gait.

## 2019-01-04 NOTE — ASU DISCHARGE PLAN (ADULT/PEDIATRIC). - MEDICATION SUMMARY - MEDICATIONS TO TAKE
I will START or STAY ON the medications listed below when I get home from the hospital:    aspirin 81 mg oral tablet  -- 1 tab(s) by mouth once a day  -- Indication: For .    acetaminophen-codeine 300 mg-30 mg oral tablet  -- 1 tab(s) by mouth every 4 hours, As needed, Moderate Pain (4 - 6) MDD:4  -- Indication: For if pain    fenofibrate 145 mg oral tablet  -- 1 tab(s) by mouth once a day  -- Indication: For .    Lipitor 40 mg oral tablet  -- 1 tab(s) by mouth once a day  -- Indication: For .    TriCor 145 mg oral tablet  -- 1 tab(s) by mouth once a day  -- Indication: For .    clopidogrel 75 mg oral tablet  -- 1 tab(s) by mouth once a day  -- Indication: For .    valACYclovir 1 g oral tablet  -- 1 tab(s) by mouth 3 times a day  -- It is very important that you take or use this exactly as directed.  Do not skip doses or discontinue unless directed by your doctor.    -- Indication: For .    Metoprolol Succinate ER 25 mg oral tablet, extended release  -- 1 tab(s) by mouth once a day  -- Indication: For .    omeprazole 20 mg oral delayed release capsule  -- 1 cap(s) by mouth once a day  -- Indication: For .    Vitamin D3 2000 intl units oral capsule  -- 1 cap(s) by mouth once a day  -- Indication: For .

## 2019-01-04 NOTE — ASU PATIENT PROFILE, ADULT - DOES PATIENT HAVE ADVANCE DIRECTIVE
No/Patient designates girlfriend Linapadmini Wild to make medical decisions on his behalf in an emergency

## 2019-01-04 NOTE — ASU PREOP CHECKLIST - HEIGHT IN FEET
1) Hydrochlorothiazide should only be taken once daily, as reflected on Rx. I cannot send another refill since she used the Rx by taking it inappropriately. The solution is to send a different thiazide diuretic, Chlorthalidone, to replace Hydrochlorothiazide, and should be taken ONCE DAILY ONLY. Rx sent.    2) I recommend that patient should be booked at 11:40 on 8/24/18, but I can see her together with Ron   6

## 2019-01-04 NOTE — ASU DISCHARGE PLAN (ADULT/PEDIATRIC). - NURSING INSTRUCTIONS
Keep dressing dry and clean for 2 days. Steri strips will fall off by itself. Keep dressing dry, clean, intact for 2 days. Do not get incision wet for 48 hours (2 days). After 2 days, remove dressing (clear tape and gauze) and leave steri strips (white strips) on. You can shower with steri strips on. The steri strips will fall off during shower. Do not rub them off. It sometimes take 2-3 days for them to fall off.

## 2019-01-08 LAB — SURGICAL PATHOLOGY STUDY: SIGNIFICANT CHANGE UP

## 2019-01-15 PROBLEM — K81.9 CHOLECYSTITIS: Status: ACTIVE | Noted: 2018-10-25

## 2019-01-17 ENCOUNTER — APPOINTMENT (OUTPATIENT)
Dept: SURGERY | Facility: CLINIC | Age: 74
End: 2019-01-17
Payer: COMMERCIAL

## 2019-01-17 DIAGNOSIS — K81.9 CHOLECYSTITIS, UNSPECIFIED: ICD-10-CM

## 2019-01-17 PROCEDURE — 99024 POSTOP FOLLOW-UP VISIT: CPT

## 2019-01-17 NOTE — PLAN
[FreeTextEntry1] : patient will follow up in 3 months or if needed. Warning signs, follow up, and restrictions were discussed with the patient. \par

## 2019-01-17 NOTE — ASSESSMENT
[FreeTextEntry1] : Patient is s/p Laparoscopic cholecystectomy with intraoperative cholangiogram on 01/04/2019.  patient offers no complaints. patient reports no fever, chills,  or  pain.  Surgical wounds are  healing well. No signs of inflammation, infection or exudate. Patient reports good bowel movements and appetite. patient states he had redness and swelling on his left arm at the . on examination he has a resolving superficial  thrombophlebitis. Patient was advised to return to the office or ED if redness on his arm gets worse of if he develops fevers and chills.

## 2019-01-17 NOTE — HISTORY OF PRESENT ILLNESS
[de-identified] : Patient is s/p Laparoscopic cholecystectomy with intraoperative cholangiogram on 01/04/2019.  patient offers no complaints. patient reports no fever, chills,  or  pain.  Surgical wounds are  healing well. No signs of inflammation, infection or exudate. Patient reports good bowel movements and appetite. patient states he had redness and swelling on his left arm at the . on examination he has a resolving superficial  thrombophlebitis

## 2019-01-17 NOTE — DATA REVIEWED
[FreeTextEntry1] : \par   AMBROSE ROLAND                       \par Surgical Final Report\par Final Diagnosis\par \par Gallbladder, cholecystectomy: Chronic calculus cholecystitis with\par focally dense lymphoid aggregates and mild\par mucosal fibrosis\par \par Verified by: Keila Macias M.D.\par (Electronic Signature)\par Reported on: 01/08/19 13:59 EST, [address] [place] [address], [place],\[place] [address]\par _________________________________________________________________\par \par Clinical History\par Cholecystitis\par Laparoscopic cholecystectomy with intraoperative cholangiogram\par  \par

## 2019-01-17 NOTE — PHYSICAL EXAM
[Calm] : calm [de-identified] : The patient is alert, well-groomed, and cheerful. [de-identified] :  Surgical wounds are  healing well. No signs of inflammation, infection or exudate

## 2019-02-15 ENCOUNTER — APPOINTMENT (OUTPATIENT)
Dept: ORTHOPEDIC SURGERY | Facility: CLINIC | Age: 74
End: 2019-02-15
Payer: OTHER MISCELLANEOUS

## 2019-02-15 VITALS — BODY MASS INDEX: 24.5 KG/M2 | WEIGHT: 175 LBS | HEIGHT: 71 IN

## 2019-02-15 VITALS — HEART RATE: 77 BPM | DIASTOLIC BLOOD PRESSURE: 75 MMHG | SYSTOLIC BLOOD PRESSURE: 154 MMHG

## 2019-02-15 PROCEDURE — 99203 OFFICE O/P NEW LOW 30 MIN: CPT

## 2019-02-15 NOTE — HISTORY OF PRESENT ILLNESS
[de-identified] : Mr. AMBROSE ROLAND  is a 73 year old male who presents with a chronic history of back pain since 10/16/14 after being injured at work.  He has had a flare-up of right lumbar pain for the past week.  Denies any LE radicular symptoms.  Normal bowel and bladder control.   Denies any recent fevers, chills, sweats, weight loss, or infection.  He has done physical therapy and chiropractic care over the years with minimal relief.  \par

## 2019-02-15 NOTE — DISCUSSION/SUMMARY
[de-identified] : Given his worsening symptoms and updated lumbar spine MRI will be obtained. Followup afterwards.

## 2019-02-15 NOTE — PHYSICAL EXAM
[Stooped] : stooped [de-identified] : Examination of the lumbar spine reveals no midline tenderness palpation, step-offs, or skin lesions. Decreased range of motion with respect to flexion, extension, lateral bending, and rotation. No tenderness to palpation of the sciatic notch. No tenderness palpation of the bilateral greater trochanters. No pain with passive internal/external rotation of the hips. No instability of bilateral lower extremities.  Negative KATHLEEN. Negative straight leg raise bilaterally. No bowstring. Negative femoral stretch. 5 out of 5 iliopsoas, hip abductors, hips adductors, quadriceps, hamstrings, gastrocsoleus, tibialis anterior, extensor hallucis longus, peroneals. Grossly intact sensation to light touch bilateral lower extremities. 1+ patellar and Achilles reflexes. Downgoing Babinski. No clonus. Intact proprioception. Palpable pulses. No skin lesion and no edema on the right and left lower extremities. [de-identified] : Review of his previous MRI imaging does demonstrate spondylosis without gross instability or aggressive lesions.  There is also an L4-5 disc herniation

## 2019-04-26 ENCOUNTER — APPOINTMENT (OUTPATIENT)
Dept: ORTHOPEDIC SURGERY | Facility: CLINIC | Age: 74
End: 2019-04-26
Payer: OTHER MISCELLANEOUS

## 2019-04-26 PROCEDURE — 99214 OFFICE O/P EST MOD 30 MIN: CPT

## 2019-05-09 RX ORDER — METOPROLOL TARTRATE 25 MG/1
25 TABLET, FILM COATED ORAL
Refills: 0 | Status: ACTIVE | COMMUNITY

## 2019-05-09 RX ORDER — FENOFIBRATE 145 MG/1
145 TABLET ORAL
Refills: 0 | Status: ACTIVE | COMMUNITY

## 2019-05-09 RX ORDER — ATORVASTATIN CALCIUM 40 MG/1
40 TABLET, FILM COATED ORAL
Refills: 0 | Status: ACTIVE | COMMUNITY

## 2019-05-13 ENCOUNTER — APPOINTMENT (OUTPATIENT)
Dept: ORTHOPEDIC SURGERY | Facility: CLINIC | Age: 74
End: 2019-05-13
Payer: COMMERCIAL

## 2019-05-13 VITALS
HEIGHT: 71 IN | WEIGHT: 175 LBS | BODY MASS INDEX: 24.5 KG/M2 | SYSTOLIC BLOOD PRESSURE: 147 MMHG | DIASTOLIC BLOOD PRESSURE: 73 MMHG | HEART RATE: 72 BPM

## 2019-05-13 DIAGNOSIS — M16.11 UNILATERAL PRIMARY OSTEOARTHRITIS, RIGHT HIP: ICD-10-CM

## 2019-05-13 PROCEDURE — 99214 OFFICE O/P EST MOD 30 MIN: CPT | Mod: 25

## 2019-05-13 PROCEDURE — 20611 DRAIN/INJ JOINT/BURSA W/US: CPT | Mod: RT

## 2019-05-13 PROCEDURE — 73502 X-RAY EXAM HIP UNI 2-3 VIEWS: CPT | Mod: RT

## 2019-05-13 RX ORDER — DICLOFENAC SODIUM 16.05 MG/ML
1.5 SOLUTION TOPICAL
Qty: 1 | Refills: 0 | Status: ACTIVE | COMMUNITY
Start: 2019-05-13 | End: 1900-01-01

## 2019-05-13 NOTE — PHYSICAL EXAM
[de-identified] : Physical Examination\par General: well nourished, in no acute distress, alert and oriented to person, place and time\par Psychiatric: normal mood and affect, no abnormal movements or speech patterns\par Eyes: vision intact - glasses\par Throat: no thyromegaly\par Lymph: no enlarged nodes, no lymphedema in extremity\par Respiratory: no wheezing, no shortness of breath with ambulation\par Cardiac: no cardiac leg swelling, 2+ peripheral pulses\par Neurology: normal gross sensation in extremities to light touch\par Abdomen: soft, non-tender, tympanic, no masses\par \par Musculoskeletal Examination\par Ambulation	- antalgic gait, - assistive devices\par \par Hip			Right			Left\par General\par      Swelling/Deformity	normal			normal	\par      Skin			normal			normal\par      Erythema		-			-\par      Standing Alignment	neutral			neutral\par Range of Motion\par      Flexion		90			90\par      Abduction		30			30\par      Flex ER		25			35\par      Flex IR		15			15\par      Knee        		0 - 130			0 - 130\par Provocative Exam\par      Log Roll		=			-\par      Heel Strike		=			-\par      Fig 4			-			-\par      SLR			-			-\par Palpation\par      Public Symphysis	-			-\par      Groin   	 	+			-\par      Greater Trochanter	-			-\par      Piriformis		-			-\par      SI Joint		-			-\par Strength Examination/Atrophy\par      Hip Flexor		5+			5+\par      Quadriceps		5+			5+\par      Hamstring		5+			5+\par Sensation\par      Deep Peroneal        	normal			normal\par      Superficial Peroneal 	normal			normal\par      Sural  	 	normal			normal\par      Posterior Tibial        	normal			normal\par      Saphenous		normal			normal\par Pulse\par      DP			2+			2+\par  [de-identified] : 2 views of the affected RIGHT hip (AP and Frog Lateral)\par were ordered, taken and evaluated by myself today and \par demonstrate:\par normal acetabular and femoral neck morphology\par Small acetabular osteophytes with subchondral sclerosis\par Mild asymmetric superior joint space loss\par Spherical femoral head without osteophytes

## 2019-05-13 NOTE — HISTORY OF PRESENT ILLNESS
[de-identified] : CC Right hip\par \par HPI 72 yo male right HD presents with acute onset of 3 weeks of intermittent pain in the groin Right hip after lifting water from the car to the house. The pain is same, and rated a 4 out of 10, described as aching, [without radiation]. Rest makes the pain better and activity makes the pain worse. The patient reports associated symptoms of swelling. The patient - similar pain previously .\par \par \par Previous treatments include:\par Activity Modification	-\par Ice/Compression 	-\par NSAIDs  		-\par Physical Therapy 	-\par Cortisone Injection	-\par Surgery  		-\par \par Review of Systems is positive for the above musculoskeletal symptoms and is otherwise non-contributory for general, constitutional, psychiatric, neurologic, HEENT, cardiac, respiratory, gastrointestinal, reproductive, lymphatic, and dermatologic complaints.\par \par Consult By

## 2019-05-13 NOTE — DISCUSSION/SUMMARY
[de-identified] : Right hip osteoarthritis\par \par \par The patient and I discussed the causes and progression of degenerative joint disease of the hip. Models, diagrams and drawings were used in the discussion. Treatment can include conservative non-operative management and surgical options. Conservative management includes weight loss, activity modification, physical therapy to improve motion and strength in the muscles around the hip and the body's core, PO NSAIDs, corticosteroid intra-articular injections. If the patient fails to improve with non-operative management, surgical management is possible. Depending upon the patient's age, BMI, activity level, degree and location of arthrosis different surgical options are possible including total joint arthroplasty.\par \par Physical therapy was prescribed for hip ROM exercises, strengthening exercises, hip abductor strengthening, lumbar core strengthening, modalities PRN, home exercises\par \par \par The patient was prescribed Diclofenac topical liquid/creme non-steroidal anti-inflammatory medication. 1-2 pumps twice daily and apply to area with pain. There is low systemic absorption of the medication but risks while reduced remain were discussed and include but not limited to renal damage and GI ulceration and bleeding. They were warned to stop the medication if worsening buring skin or gastric pain or dizziness or other side effects. Also to immediately stop the medication and seek appropriate medical attention if any severe stomach ache, gastritis, black/red vomit, black/red stools or any other medical concern.\par \par Procedure Note:\par \par Verbal consent was obtained for arthrocentesis and intra-articular corticosteroid injection of the RIGHT hip, after the risks and benefits were discussed with the patient. Potential adverse effects were discussed including but not limited to bleeding, skin/ joint infection, local skin reactions including bleaching, bruising, stiffness, soreness, vasovagal episodes, transient hyperglycemia, avascular necrosis, pseudo-septic type reactions, post injection joint pain, allergic reaction to product or anesthetic and other rare but potential adverse effects along with benefits including decreased pain and improved stability prior to obtaining verbal informed consent. It was also discussed that for some patients the treatment is ineffective and there are no guarantees that the patient will experience improvement as the result of the injection. In rare occasions the injection can cause worsening of pain.\par \par The use of a Sonosite 5-2 MHz curvilinear transducer with live ultrasound guidance of the hip injection was necessary given the patient's BMI and local body habitus overlying and obscuring the identification of normal body bony anatomy used to identify the injection site and the depth of soft tissue envelope necessitating a longer than normal needle to reach the joint space, and to confirm the location of the needle tip and intra-articular delivery of the medication. Without the use of live ultrasound guidance the injection would have been more difficult and place the patient's neurovascular structures at risk from the longer needle needed to traverse the soft tissue envelope.\par \par The anterolateral injection site was identified using the ultrasound probe to identify the femoral head and anterior neck longitudinally along the femoral neck axis. The injection site was marked and prepped with a ChloraPrep swab and anesthetized with ethylchloride skin anesthesia. Using sterile technique a 20g 3 1/2 in spinal needle with 3 cc total of 1cc 1% lidocaine without epinephrine, 1cc 0.25% Marcaine without epinephrine and 1cc of 40mg/mL Kenalog was passed through the injection site towards the intra-capsular portion of the anterior femoral neck. After penetrating the joint capsule, the medication was injected without resistance under live ultrasound visualization and noted to distend the joint capsule. The injection site was sterilely dressed, there was minimal blood loss. The patient tolerated this procedure without any complications done by myself. Images were recorded and saved.\par \par The patient has been advised that if they notice any worsening of symptoms or any problems to contact me and seek care from a qualified medical professional. The patient was instructed to ice the hip and take NSAID medication on an as needed basis if the patient feels discomfort.\par \par The patient verifies their understanding the the visit, diagnosis and plan. They agree with the treatment plan and will contact the office with any questions or problems.\par \par Follow up\par PRN

## 2019-05-17 ENCOUNTER — APPOINTMENT (OUTPATIENT)
Dept: NEUROSURGERY | Facility: CLINIC | Age: 74
End: 2019-05-17
Payer: OTHER MISCELLANEOUS

## 2019-05-17 VITALS
DIASTOLIC BLOOD PRESSURE: 78 MMHG | WEIGHT: 175 LBS | SYSTOLIC BLOOD PRESSURE: 169 MMHG | BODY MASS INDEX: 24.5 KG/M2 | HEIGHT: 71 IN | HEART RATE: 67 BPM

## 2019-05-17 PROCEDURE — 99243 OFF/OP CNSLTJ NEW/EST LOW 30: CPT

## 2019-05-17 RX ORDER — FENOFIBRATE 150 MG/1
CAPSULE ORAL
Refills: 0 | Status: DISCONTINUED | COMMUNITY
End: 2019-05-17

## 2019-05-17 RX ORDER — FENOFIBRATE,MICRONIZED 67 MG
CAPSULE ORAL
Refills: 0 | Status: DISCONTINUED | COMMUNITY
End: 2019-05-17

## 2019-05-17 NOTE — ASSESSMENT
[FreeTextEntry1] : 73 year old male with low back pain secondary to disc herniation and DDD.  Given the nature of the patient's complaints and lack of significant improvement following more conservative measures, we did discuss lumbar epidural steroid injection.  Risks, benefits, and expectations of the procedure were reviewed.  The patient was provided with an educational pamphlet outlining the details of the procedure so that he/she may have the ability to review the information prior to proceeding.  The patient has agreed to proceed and will follow up with me for the procedure.\par

## 2019-05-17 NOTE — DATA REVIEWED
[de-identified] : MRI lumbar spine done 4/12/19 showed evidence of L4-L5 left paracentral disc herniation with left L5 lateral recess encroachment and mild central stenosis, left paracentral disc herniation at L3-L4 with mild stenosis

## 2019-05-17 NOTE — HISTORY OF PRESENT ILLNESS
[Back] : back [___ yrs] : [unfilled] year(s) ago [Date: ___] : ~His/Her~ pain is a result of an accident or injury occuring on [unfilled] [5] : a current pain level of 5/10 [8] : a maximum pain level of 8/10 [Aching] : aching [Tingling] : tingling [Standing] : standing [Walking] : walking [Sitting] : sitting [Gait Dysfunction] : gait dysfunction [Chiropractor] : chiropractor [PT] : PT [Injections] : injections [FreeTextEntry1] : pulled his back while lifting something heavy at work [FreeTextEntry2] : tingling in the right leg [FreeTextEntry6] : had one "injection" at [facility] which helped

## 2019-05-17 NOTE — PHYSICAL EXAM
[General Appearance - Alert] : alert [Mood] : the mood was normal [Sensation Tactile Decrease] : light touch was intact [Motor Strength] : muscle strength was normal in all four extremities [Straight-Leg Raise Test - Left] : straight leg raise of the left leg was negative [2+] : Patella left 2+ [Able to toe walk] : the patient was able to toe walk [Straight-Leg Raise Test - Right] : straight leg raise  of the right leg was negative [No Spinal Tenderness] : no spinal tenderness [Able to heel walk] : the patient was able to heel walk [] : no rash

## 2019-05-20 ENCOUNTER — APPOINTMENT (OUTPATIENT)
Dept: ORTHOPEDIC SURGERY | Facility: CLINIC | Age: 74
End: 2019-05-20
Payer: COMMERCIAL

## 2019-05-20 VITALS — DIASTOLIC BLOOD PRESSURE: 72 MMHG | HEART RATE: 74 BPM | SYSTOLIC BLOOD PRESSURE: 137 MMHG

## 2019-05-20 DIAGNOSIS — M17.11 UNILATERAL PRIMARY OSTEOARTHRITIS, RIGHT KNEE: ICD-10-CM

## 2019-05-20 DIAGNOSIS — M17.12 UNILATERAL PRIMARY OSTEOARTHRITIS, LEFT KNEE: ICD-10-CM

## 2019-05-20 DIAGNOSIS — M25.521 PAIN IN RIGHT ELBOW: ICD-10-CM

## 2019-05-20 PROCEDURE — 99214 OFFICE O/P EST MOD 30 MIN: CPT

## 2019-05-20 PROCEDURE — 73080 X-RAY EXAM OF ELBOW: CPT | Mod: RT

## 2019-05-20 NOTE — HISTORY OF PRESENT ILLNESS
[de-identified] : CC right Elbow\par \par HPI 72 yo male right HD presents with gradual onset of several weeks of activity related pain in the posterior elbow several centimeters up from the olecranon when he places his elbow onto the console of his car. The pain is worse, and rated a 8 out of 10, described as pain, [without radiation]. Not putting elbow on the console of his car makes the pain better and placing the elbow on the console of his car makes the pain worse. The patient reports associated symptoms of none. The patient - pain at night affecting sleep, - neck pain, and - similar pain previously.\par \par The patient has tried the following treatments:\par Activity modification	-\par Ice			-\par Braces    		-\par Nsaids    		-\par Physical Therapy  	-\par Cortisone Injection	-\par Arthroscopy/Surgery	-\par \par Review of Systems is positive for the above musculoskeletal symptoms and is otherwise non-contributory for general, constitutional, psychiatric, neurologic, HEENT, cardiac, respiratory, gastrointestinal, reproductive, lymphatic, and dermatologic complaints.\par \par Consult by

## 2019-05-20 NOTE — PHYSICAL EXAM
[de-identified] : 3 views of the right elbow (AP, lateral and radial head)\par were ordered, obtained and evaluated by myself today and\par demonstrate:\par The radial head aligns with the capitellum on all views\par no degenerative joint disease\par no fracture\par no dislocation \par Otherwise normal osseous bone structure and soft tissue contour except for small enthesophyte triceps tendon\par  [de-identified] : Physical Examination\par General: well nourished, in no acute distress, alert and oriented to person, place and time\par Psychiatric: normal mood and affect, no abnormal movements or speech patterns\par Eyes: vision intact - glasses\par Throat: no thyromegaly\par Lymph: no enlarged nodes, no lymphedema in extremity\par Respiratory: no wheezing, no shortness of breath with ambulation\par Cardiac: no cardiac leg swelling, 2+ peripheral pulses\par Neurology: normal gross sensation in extremities to light touch\par Abdomen: soft, non-tender, tympanic, no masses\par \par Musculoskeletal Examination\par Cervical spine		Full painless range of motion and negative Spurling's test\par \par Elbow     			Right			Left\par Appearance\par      Skin 				normal			normal\par      Swelling/Deformity		normal			normal\par  Range of Motion\par      Flexion			160			160\par      Extension			0			0\par      Supination			85			85\par      Pronation			90			90\par Palpation\par      Radial Head			-			-\par      Lateral Epicondyle		-			-\par      Medial Epicondyle		-			-\par      Olecranon			-			-\par      Triceps Tendon		-			-\par      Biceps Tendon		-			-\par \par Strength Examination\par      Flexion 			5+ / 0			5+ / 0\par      Extension			5+ / 0			5+ / 0\par      Supination			5+ / 0			5+ / 0\par      Pronation			5+ / 0			5+ / 0\par      				normal			normal\par \par Special Examination\par      Milking Posterolateral		-			-\par      Chair Rise Posteromedial 	-			-\par      Ulnar Cubital Tunnel Tinnels	-			-\par      Sublux Ulnar Nerve		-			-\par      \par Sensation\par      Axillary			normal			normal\par      LatAntCubBrach 		normal			normal\par      Median 			normal			normal\par      Ulnar 			normal			normal\par      Radial 			normal			normal\par Motor\par      AIN 				normal			normal\par      Ulnar 			normal			normal\par      Radial 			normal			normal\par      PIN 				normal			normal\par Pulses\par      Radial			2+			2+\par

## 2019-05-20 NOTE — DISCUSSION/SUMMARY
[de-identified] : Right hip osteoarthritis\par Right knee pain\par Right elbow pain\par \par \par I discussed my findings and history exam and radiology\par \par Physical therapy\par \par Continue topical diclofenac\par \par Followup p.r.n.

## 2019-05-29 ENCOUNTER — EMERGENCY (EMERGENCY)
Facility: HOSPITAL | Age: 74
LOS: 1 days | Discharge: HOME | End: 2019-05-29
Attending: EMERGENCY MEDICINE
Payer: COMMERCIAL

## 2019-05-29 ENCOUNTER — TRANSCRIPTION ENCOUNTER (OUTPATIENT)
Age: 74
End: 2019-05-29

## 2019-05-29 VITALS
OXYGEN SATURATION: 98 % | WEIGHT: 175.05 LBS | SYSTOLIC BLOOD PRESSURE: 153 MMHG | HEIGHT: 71 IN | TEMPERATURE: 98 F | HEART RATE: 58 BPM | DIASTOLIC BLOOD PRESSURE: 73 MMHG | RESPIRATION RATE: 18 BRPM

## 2019-05-29 DIAGNOSIS — Z98.890 OTHER SPECIFIED POSTPROCEDURAL STATES: Chronic | ICD-10-CM

## 2019-05-29 DIAGNOSIS — Z95.5 PRESENCE OF CORONARY ANGIOPLASTY IMPLANT AND GRAFT: Chronic | ICD-10-CM

## 2019-05-29 LAB
ALBUMIN SERPL ELPH-MCNC: 4.1 G/DL — SIGNIFICANT CHANGE UP (ref 3.5–5)
ALP SERPL-CCNC: 38 U/L — LOW (ref 40–120)
ALT FLD-CCNC: 32 U/L DA — SIGNIFICANT CHANGE UP (ref 10–60)
ANION GAP SERPL CALC-SCNC: 6 MMOL/L — SIGNIFICANT CHANGE UP (ref 5–17)
AST SERPL-CCNC: 29 U/L — SIGNIFICANT CHANGE UP (ref 10–40)
BASOPHILS # BLD AUTO: 0.02 K/UL — SIGNIFICANT CHANGE UP (ref 0–0.2)
BASOPHILS NFR BLD AUTO: 0.2 % — SIGNIFICANT CHANGE UP (ref 0–2)
BILIRUB SERPL-MCNC: 0.4 MG/DL — SIGNIFICANT CHANGE UP (ref 0.2–1.2)
BUN SERPL-MCNC: 23 MG/DL — HIGH (ref 7–18)
CALCIUM SERPL-MCNC: 9.8 MG/DL — SIGNIFICANT CHANGE UP (ref 8.4–10.5)
CHLORIDE SERPL-SCNC: 103 MMOL/L — SIGNIFICANT CHANGE UP (ref 96–108)
CK SERPL-CCNC: 91 U/L — SIGNIFICANT CHANGE UP (ref 35–232)
CO2 SERPL-SCNC: 30 MMOL/L — SIGNIFICANT CHANGE UP (ref 22–31)
CREAT SERPL-MCNC: 1.15 MG/DL — SIGNIFICANT CHANGE UP (ref 0.5–1.3)
EOSINOPHIL # BLD AUTO: 0.12 K/UL — SIGNIFICANT CHANGE UP (ref 0–0.5)
EOSINOPHIL NFR BLD AUTO: 1.1 % — SIGNIFICANT CHANGE UP (ref 0–6)
GLUCOSE SERPL-MCNC: 116 MG/DL — HIGH (ref 70–99)
HCT VFR BLD CALC: 38.9 % — LOW (ref 39–50)
HGB BLD-MCNC: 13.3 G/DL — SIGNIFICANT CHANGE UP (ref 13–17)
IMM GRANULOCYTES NFR BLD AUTO: 0.7 % — SIGNIFICANT CHANGE UP (ref 0–1.5)
LYMPHOCYTES # BLD AUTO: 1.22 K/UL — SIGNIFICANT CHANGE UP (ref 1–3.3)
LYMPHOCYTES # BLD AUTO: 11 % — LOW (ref 13–44)
MCHC RBC-ENTMCNC: 33.9 PG — SIGNIFICANT CHANGE UP (ref 27–34)
MCHC RBC-ENTMCNC: 34.2 GM/DL — SIGNIFICANT CHANGE UP (ref 32–36)
MCV RBC AUTO: 99.2 FL — SIGNIFICANT CHANGE UP (ref 80–100)
MONOCYTES # BLD AUTO: 1.19 K/UL — HIGH (ref 0–0.9)
MONOCYTES NFR BLD AUTO: 10.8 % — SIGNIFICANT CHANGE UP (ref 2–14)
NEUTROPHILS # BLD AUTO: 8.43 K/UL — HIGH (ref 1.8–7.4)
NEUTROPHILS NFR BLD AUTO: 76.2 % — SIGNIFICANT CHANGE UP (ref 43–77)
NRBC # BLD: 0 /100 WBCS — SIGNIFICANT CHANGE UP (ref 0–0)
PLATELET # BLD AUTO: 235 K/UL — SIGNIFICANT CHANGE UP (ref 150–400)
POTASSIUM SERPL-MCNC: 4.2 MMOL/L — SIGNIFICANT CHANGE UP (ref 3.5–5.3)
POTASSIUM SERPL-SCNC: 4.2 MMOL/L — SIGNIFICANT CHANGE UP (ref 3.5–5.3)
PROT SERPL-MCNC: 8 G/DL — SIGNIFICANT CHANGE UP (ref 6–8.3)
RBC # BLD: 3.92 M/UL — LOW (ref 4.2–5.8)
RBC # FLD: 13.3 % — SIGNIFICANT CHANGE UP (ref 10.3–14.5)
SODIUM SERPL-SCNC: 139 MMOL/L — SIGNIFICANT CHANGE UP (ref 135–145)
TROPONIN I SERPL-MCNC: 0.02 NG/ML — SIGNIFICANT CHANGE UP (ref 0–0.04)
WBC # BLD: 11.06 K/UL — HIGH (ref 3.8–10.5)
WBC # FLD AUTO: 11.06 K/UL — HIGH (ref 3.8–10.5)

## 2019-05-29 PROCEDURE — 93010 ELECTROCARDIOGRAM REPORT: CPT

## 2019-05-29 PROCEDURE — 85027 COMPLETE CBC AUTOMATED: CPT

## 2019-05-29 PROCEDURE — 99283 EMERGENCY DEPT VISIT LOW MDM: CPT

## 2019-05-29 PROCEDURE — 36415 COLL VENOUS BLD VENIPUNCTURE: CPT

## 2019-05-29 PROCEDURE — 84484 ASSAY OF TROPONIN QUANT: CPT

## 2019-05-29 PROCEDURE — 93005 ELECTROCARDIOGRAM TRACING: CPT

## 2019-05-29 PROCEDURE — 99285 EMERGENCY DEPT VISIT HI MDM: CPT

## 2019-05-29 PROCEDURE — 82550 ASSAY OF CK (CPK): CPT

## 2019-05-29 PROCEDURE — 80053 COMPREHEN METABOLIC PANEL: CPT

## 2019-05-29 PROCEDURE — 82962 GLUCOSE BLOOD TEST: CPT

## 2019-05-29 RX ORDER — SUMATRIPTAN SUCCINATE 4 MG/.5ML
0.6 INJECTION, SOLUTION SUBCUTANEOUS ONCE
Refills: 0 | Status: DISCONTINUED | OUTPATIENT
Start: 2019-05-29 | End: 2019-05-29

## 2019-05-29 RX ORDER — MECLIZINE HCL 12.5 MG
25 TABLET ORAL ONCE
Refills: 0 | Status: COMPLETED | OUTPATIENT
Start: 2019-05-29 | End: 2019-05-29

## 2019-05-29 RX ORDER — MECLIZINE HCL 12.5 MG
1 TABLET ORAL
Qty: 20 | Refills: 0
Start: 2019-05-29

## 2019-05-29 RX ORDER — SODIUM CHLORIDE 9 MG/ML
1000 INJECTION INTRAMUSCULAR; INTRAVENOUS; SUBCUTANEOUS ONCE
Refills: 0 | Status: COMPLETED | OUTPATIENT
Start: 2019-05-29 | End: 2019-05-29

## 2019-05-29 RX ADMIN — Medication 25 MILLIGRAM(S): at 18:25

## 2019-05-29 RX ADMIN — SODIUM CHLORIDE 1000 MILLILITER(S): 9 INJECTION INTRAMUSCULAR; INTRAVENOUS; SUBCUTANEOUS at 17:50

## 2019-05-29 NOTE — ED ADULT NURSE NOTE - OBJECTIVE STATEMENT
Pt reported  feeling flushed with dizziness and shivering while at his doctors office. Pt denied any pain, headache, SOB or discomfort.

## 2019-05-29 NOTE — ED PROVIDER NOTE - OBJECTIVE STATEMENT
74 y/o M with a significant PMHx of CAD, 3 stents, HLD, HTN, and prediabetes presents to the ED with complaints of "attack of vertigo". Patient states he was standing when he felt room spinning sensation, followed by vomiting. Patient notes having felt room spinning sensation in past. Patient reports feeling better now with slight dizziness. Denies chest pain, shortness of breath or any other complaints.

## 2019-05-29 NOTE — ED PROVIDER NOTE - CLINICAL SUMMARY MEDICAL DECISION MAKING FREE TEXT BOX
72 y/o M presents with room spinning sensation, now mostly resolved. Will order labs, meclozine and reassess.

## 2019-05-29 NOTE — ED ADULT NURSE NOTE - CAS EDN DISCHARGE ASSESSMENT
No adverse reaction to first time med in ED/Alert and oriented to person, place and time/Awake/Symptoms improved

## 2019-05-29 NOTE — ED PROVIDER NOTE - MUSCULOSKELETAL, MLM
Spine appears normal, range of motion is not limited, no muscle or joint tenderness. Strength and sensation intact for all extremities.

## 2019-05-30 ENCOUNTER — APPOINTMENT (OUTPATIENT)
Dept: NEUROSURGERY | Facility: CLINIC | Age: 74
End: 2019-05-30
Payer: OTHER MISCELLANEOUS

## 2019-05-30 ENCOUNTER — OUTPATIENT (OUTPATIENT)
Dept: OUTPATIENT SERVICES | Facility: HOSPITAL | Age: 74
LOS: 1 days | End: 2019-05-30
Payer: COMMERCIAL

## 2019-05-30 DIAGNOSIS — Z95.5 PRESENCE OF CORONARY ANGIOPLASTY IMPLANT AND GRAFT: Chronic | ICD-10-CM

## 2019-05-30 DIAGNOSIS — Z98.890 OTHER SPECIFIED POSTPROCEDURAL STATES: Chronic | ICD-10-CM

## 2019-05-30 DIAGNOSIS — M54.16 RADICULOPATHY, LUMBAR REGION: ICD-10-CM

## 2019-05-30 PROCEDURE — 62323 NJX INTERLAMINAR LMBR/SAC: CPT

## 2019-06-18 ENCOUNTER — APPOINTMENT (OUTPATIENT)
Dept: NEUROSURGERY | Facility: CLINIC | Age: 74
End: 2019-06-18
Payer: OTHER MISCELLANEOUS

## 2019-06-18 VITALS
HEIGHT: 71 IN | HEART RATE: 68 BPM | BODY MASS INDEX: 24.78 KG/M2 | WEIGHT: 177 LBS | SYSTOLIC BLOOD PRESSURE: 128 MMHG | DIASTOLIC BLOOD PRESSURE: 77 MMHG

## 2019-06-18 DIAGNOSIS — M47.817 SPONDYLOSIS W/OUT MYELOPATHY OR RADICULOPATHY, LUMBOSACRAL REGION: ICD-10-CM

## 2019-06-18 PROCEDURE — 99213 OFFICE O/P EST LOW 20 MIN: CPT

## 2019-06-18 NOTE — ASSESSMENT
[FreeTextEntry1] : 73 year old male with low back and lumbar radicular pain only minimally improved following his first injection.  He is agreeable to a repeat injection and will follow up with me next week for his procedure.

## 2019-07-08 ENCOUNTER — APPOINTMENT (OUTPATIENT)
Dept: NEUROSURGERY | Facility: CLINIC | Age: 74
End: 2019-07-08

## 2019-09-23 ENCOUNTER — APPOINTMENT (OUTPATIENT)
Dept: ORTHOPEDIC SURGERY | Facility: CLINIC | Age: 74
End: 2019-09-23
Payer: OTHER MISCELLANEOUS

## 2019-09-23 DIAGNOSIS — M51.36 OTHER INTERVERTEBRAL DISC DEGENERATION, LUMBAR REGION: ICD-10-CM

## 2019-09-23 PROCEDURE — 99214 OFFICE O/P EST MOD 30 MIN: CPT

## 2019-09-26 PROBLEM — M51.36 DDD (DEGENERATIVE DISC DISEASE), LUMBAR: Status: ACTIVE | Noted: 2019-05-17

## 2019-09-26 NOTE — DISCUSSION/SUMMARY
[de-identified] : We discussed further treatment options. Given his worsening symptoms an MRI will be obtained. Follow up afterwards.

## 2019-09-26 NOTE — HISTORY OF PRESENT ILLNESS
[de-identified] : Mr. AMBROSE ROLAND  is a 73 year old male who presents to the office for a follow-up visit.  No gross change in his symptoms. He had an injection.

## 2019-09-26 NOTE — PHYSICAL EXAM
[Stooped] : stooped [de-identified] : Examination of the lumbar spine reveals no midline tenderness palpation, step-offs, or skin lesions. Decreased range of motion with respect to flexion, extension, lateral bending, and rotation. No tenderness to palpation of the sciatic notch. No tenderness palpation of the bilateral greater trochanters. No pain with passive internal/external rotation of the hips. No instability of bilateral lower extremities.  Negative KATHLEEN. Negative straight leg raise bilaterally. No bowstring. Negative femoral stretch. 5 out of 5 iliopsoas, hip abductors, hips adductors, quadriceps, hamstrings, gastrocsoleus, tibialis anterior, extensor hallucis longus, peroneals. Grossly intact sensation to light touch bilateral lower extremities. 1+ patellar and Achilles reflexes. Downgoing Babinski. No clonus. Intact proprioception. Palpable pulses. No skin lesion and no edema on the right and left lower extremities. [de-identified] : Review of his updated MRI of his lumbar spine reveals an L4-5 disc herniation in the lateral recess compressing the resting left L5 nerve root

## 2019-10-04 ENCOUNTER — APPOINTMENT (OUTPATIENT)
Dept: ORTHOPEDIC SURGERY | Facility: CLINIC | Age: 74
End: 2019-10-04
Payer: OTHER MISCELLANEOUS

## 2019-10-04 VITALS
WEIGHT: 175 LBS | TEMPERATURE: 97.8 F | HEIGHT: 71 IN | SYSTOLIC BLOOD PRESSURE: 135 MMHG | OXYGEN SATURATION: 100 % | BODY MASS INDEX: 24.5 KG/M2 | HEART RATE: 76 BPM | DIASTOLIC BLOOD PRESSURE: 68 MMHG

## 2019-10-04 DIAGNOSIS — M48.07 SPINAL STENOSIS, LUMBOSACRAL REGION: ICD-10-CM

## 2019-10-04 DIAGNOSIS — M51.26 OTHER INTERVERTEBRAL DISC DISPLACEMENT, LUMBAR REGION: ICD-10-CM

## 2019-10-04 DIAGNOSIS — M54.16 RADICULOPATHY, LUMBAR REGION: ICD-10-CM

## 2019-10-04 PROCEDURE — 99214 OFFICE O/P EST MOD 30 MIN: CPT

## 2019-10-04 NOTE — DISCUSSION/SUMMARY
[de-identified] : We discussed further treatment options. He continues to have back and leg pain worse with standing walking. Walking with a shopping cart significantly improves his symptoms. At this point he states he can only walk 2 blocks and then has to sit down. He has tried medications, physical therapy, and epidural injections. We discussed the nature purpose of lumbar decompression at the L4-5 level for treatment of his symptoms. He would like to proceed with this option. We will asked for authorization from worker's compensation and he will follow up afterwards.

## 2019-10-04 NOTE — PHYSICAL EXAM
[Stooped] : stooped [de-identified] : Examination of the lumbar spine reveals no midline tenderness palpation, step-offs, or skin lesions. Decreased range of motion with respect to flexion, extension, lateral bending, and rotation. No tenderness to palpation of the sciatic notch. No tenderness palpation of the bilateral greater trochanters. No pain with passive internal/external rotation of the hips. No instability of bilateral lower extremities.  Negative KATHLEEN. Negative straight leg raise bilaterally. No bowstring. Negative femoral stretch. 5 out of 5 iliopsoas, hip abductors, hips adductors, quadriceps, hamstrings, gastrocsoleus, tibialis anterior, extensor hallucis longus, peroneals. Grossly intact sensation to light touch bilateral lower extremities. 1+ patellar and Achilles reflexes. Downgoing Babinski. No clonus. Intact proprioception. Palpable pulses. No skin lesion and no edema on the right and left lower extremities. [de-identified] : Updated lumbar spine MRI reveals increased stenosis now moderate degree at L4-5 the disc herniation compressing the traversing nerve roots. Degenerative changes L5-S1

## 2019-10-04 NOTE — HISTORY OF PRESENT ILLNESS
[de-identified] : Mr. AMBROSE ROLAND  is a 73 year old male who presents to the office for a follow-up visit.  He is here to review his MRI results.

## 2019-10-18 ENCOUNTER — OUTPATIENT (OUTPATIENT)
Dept: OUTPATIENT SERVICES | Facility: HOSPITAL | Age: 74
LOS: 1 days | End: 2019-10-18
Payer: OTHER MISCELLANEOUS

## 2019-10-18 VITALS
OXYGEN SATURATION: 97 % | DIASTOLIC BLOOD PRESSURE: 76 MMHG | HEIGHT: 71 IN | RESPIRATION RATE: 16 BRPM | TEMPERATURE: 97 F | WEIGHT: 179.9 LBS | HEART RATE: 63 BPM | SYSTOLIC BLOOD PRESSURE: 130 MMHG

## 2019-10-18 DIAGNOSIS — Z98.890 OTHER SPECIFIED POSTPROCEDURAL STATES: Chronic | ICD-10-CM

## 2019-10-18 DIAGNOSIS — Z90.49 ACQUIRED ABSENCE OF OTHER SPECIFIED PARTS OF DIGESTIVE TRACT: Chronic | ICD-10-CM

## 2019-10-18 DIAGNOSIS — M48.07 SPINAL STENOSIS, LUMBOSACRAL REGION: ICD-10-CM

## 2019-10-18 DIAGNOSIS — Z95.5 PRESENCE OF CORONARY ANGIOPLASTY IMPLANT AND GRAFT: Chronic | ICD-10-CM

## 2019-10-18 LAB
ANION GAP SERPL CALC-SCNC: 15 MMO/L — HIGH (ref 7–14)
BLD GP AB SCN SERPL QL: NEGATIVE — SIGNIFICANT CHANGE UP
BUN SERPL-MCNC: 18 MG/DL — SIGNIFICANT CHANGE UP (ref 7–23)
CALCIUM SERPL-MCNC: 9.9 MG/DL — SIGNIFICANT CHANGE UP (ref 8.4–10.5)
CHLORIDE SERPL-SCNC: 105 MMOL/L — SIGNIFICANT CHANGE UP (ref 98–107)
CO2 SERPL-SCNC: 21 MMOL/L — LOW (ref 22–31)
CREAT SERPL-MCNC: 0.94 MG/DL — SIGNIFICANT CHANGE UP (ref 0.5–1.3)
GLUCOSE SERPL-MCNC: 101 MG/DL — HIGH (ref 70–99)
HCT VFR BLD CALC: 34.3 % — LOW (ref 39–50)
HGB BLD-MCNC: 11.7 G/DL — LOW (ref 13–17)
MCHC RBC-ENTMCNC: 33.3 PG — SIGNIFICANT CHANGE UP (ref 27–34)
MCHC RBC-ENTMCNC: 34.1 % — SIGNIFICANT CHANGE UP (ref 32–36)
MCV RBC AUTO: 97.7 FL — SIGNIFICANT CHANGE UP (ref 80–100)
NRBC # FLD: 0 K/UL — SIGNIFICANT CHANGE UP (ref 0–0)
PLATELET # BLD AUTO: 261 K/UL — SIGNIFICANT CHANGE UP (ref 150–400)
PMV BLD: 9.7 FL — SIGNIFICANT CHANGE UP (ref 7–13)
POTASSIUM SERPL-MCNC: 3.6 MMOL/L — SIGNIFICANT CHANGE UP (ref 3.5–5.3)
POTASSIUM SERPL-SCNC: 3.6 MMOL/L — SIGNIFICANT CHANGE UP (ref 3.5–5.3)
RBC # BLD: 3.51 M/UL — LOW (ref 4.2–5.8)
RBC # FLD: 12.7 % — SIGNIFICANT CHANGE UP (ref 10.3–14.5)
RH IG SCN BLD-IMP: POSITIVE — SIGNIFICANT CHANGE UP
SODIUM SERPL-SCNC: 141 MMOL/L — SIGNIFICANT CHANGE UP (ref 135–145)
WBC # BLD: 7.95 K/UL — SIGNIFICANT CHANGE UP (ref 3.8–10.5)
WBC # FLD AUTO: 7.95 K/UL — SIGNIFICANT CHANGE UP (ref 3.8–10.5)

## 2019-10-18 PROCEDURE — 93010 ELECTROCARDIOGRAM REPORT: CPT

## 2019-10-18 RX ORDER — METOPROLOL TARTRATE 50 MG
1 TABLET ORAL
Qty: 0 | Refills: 0 | DISCHARGE

## 2019-10-18 RX ORDER — OMEPRAZOLE 10 MG/1
1 CAPSULE, DELAYED RELEASE ORAL
Qty: 0 | Refills: 0 | DISCHARGE

## 2019-10-18 RX ORDER — ATORVASTATIN CALCIUM 80 MG/1
1 TABLET, FILM COATED ORAL
Qty: 0 | Refills: 0 | DISCHARGE

## 2019-10-18 RX ORDER — FENOFIBRATE,MICRONIZED 130 MG
1 CAPSULE ORAL
Qty: 0 | Refills: 0 | DISCHARGE

## 2019-10-18 NOTE — H&P PST ADULT - HISTORY OF PRESENT ILLNESS
74 year old male presents to presurgical testing with diagnosis of spinal stenosis, lumbosacral region, other intervertebral disc displacement, lumbar region scheduled for posterior L4-L5 lumbar laminectomy and discectomy. Pt with lower back pain x 5 years with persistent symptoms despite conservative management 74 year old male presents to presurgical testing with diagnosis of spinal stenosis, lumbosacral region, other intervertebral disc displacement, lumbar region scheduled for posterior L4-L5 lumbar laminectomy and discectomy for 10/22/19. Pt with lower back pain x 5 years with persistent symptoms despite conservative management

## 2019-10-18 NOTE — H&P PST ADULT - ASSESSMENT
spinal stenosis, lumbosacral region  other intervertebral disc displacement, lumbar region spinal stenosis, lumbosacral region  other intervertebral disc displacement, lumbar region     Problem: Spinal stenosis, lumbosarcal region  Assessment and Plan: Pt is scheduled for posterior L4-L5 lumbar laminectomy and discectomy for 10/22/19. Pre-op instructions provided. Pt given verbal and written instructions with teach back on chlorhexidine shampoo and pepcid. Pt verbalized understanding with return demonstration.     DENZEL precautions, OR booking notified    Pending cardiac evaluation with last echo and stress test and comparison ekg due to Hx of CAD and three cardiac stents from 2017. Marisa surgical coordinator notified via voicemail. Pt will see cardiologist on Monday 10/21    Problem: HTN  Assessment and Plan: Pt instructed to take metoprolol the night prior to procedure as usual

## 2019-10-18 NOTE — H&P PST ADULT - NEGATIVE OPHTHALMOLOGIC SYMPTOMS
no blurred vision R/no loss of vision R/no blurred vision L/no loss of vision L/no pain R/no photophobia/no pain L/no diplopia

## 2019-10-18 NOTE — H&P PST ADULT - NEGATIVE ENMT SYMPTOMS
no sinus symptoms/no vertigo/no throat pain/no dysphagia/no tinnitus/no hearing difficulty/no nose bleeds/no ear pain

## 2019-10-18 NOTE — H&P PST ADULT - NSICDXPASTMEDICALHX_GEN_ALL_CORE_FT
PAST MEDICAL HISTORY:  CAD (coronary artery disease)     Herpes simplex 2017    HLD (hyperlipidemia)     HTN (hypertension)     Prediabetes     Stented coronary artery x3 2017    Varicose vein of leg s/p surgery    Vitamin D deficiency

## 2019-10-18 NOTE — H&P PST ADULT - MUSCULOSKELETAL
details… detailed exam no joint warmth/no calf tenderness/no joint erythema/normal strength/no joint swelling/ROM intact

## 2019-10-18 NOTE — H&P PST ADULT - NSICDXPASTSURGICALHX_GEN_ALL_CORE_FT
PAST SURGICAL HISTORY:  H/O heart artery stent x 3 2017    H/O varicose vein ligation and stripping     S/P laparoscopic cholecystectomy 1/2019

## 2019-10-18 NOTE — H&P PST ADULT - NEGATIVE NEUROLOGICAL SYMPTOMS
no weakness/no paresthesias/no generalized seizures/no focal seizures/no syncope/no transient paralysis/no headache/no tremors

## 2019-10-18 NOTE — H&P PST ADULT - RS GEN PE MLT RESP DETAILS PC
breath sounds equal/airway patent/respirations non-labored/no rhonchi/no wheezes/no rales/clear to auscultation bilaterally/good air movement

## 2019-10-19 LAB — SPECIMEN SOURCE: SIGNIFICANT CHANGE UP

## 2019-10-21 LAB — BACTERIA NPH CULT: SIGNIFICANT CHANGE UP

## 2019-10-22 ENCOUNTER — APPOINTMENT (OUTPATIENT)
Dept: ORTHOPEDIC SURGERY | Facility: HOSPITAL | Age: 74
End: 2019-10-22

## 2019-10-28 ENCOUNTER — CHART COPY (OUTPATIENT)
Age: 74
End: 2019-10-28

## 2020-01-20 ENCOUNTER — FORM ENCOUNTER (OUTPATIENT)
Age: 75
End: 2020-01-20

## 2020-02-07 NOTE — PATIENT PROFILE ADULT - HAS THE PATIENT RECEIVED THE INFLUENZA VACCINE THIS SEASON?
[facility]  Urology Progress Note    Pretty Stovall Patient Status:  Outpatient in a Bed    1945 MRN [id number]   [facility] 3NW-A Attending Abi Degroot MD   [facility] Day # 0 PCP Cirilo Escobar MD     Subjective:  Pretty Stovall is a(n) catheter re-inserted in urology office vs ICC/ER. Patient agrees. Follow-up end of next week for post-op evaluation and repeat PVR bladder scan. Above discussed with nurse.       Hallie Jones P.A.-C  [facility] Urology yes...

## 2020-02-20 NOTE — DISCHARGE NOTE ADULT - CARE PLAN
Yes
Principal Discharge DX:	Acute cholecystitis  Goal:	tolerate diet, resume regular activities  Assessment and plan of treatment:	continue antibiotics, cont low fat diet, cont aspirin, plavix and cholesterol medication at home. Follow-up with Dr. Salazar and cardiology outpatient in one week

## 2020-09-15 NOTE — ED ADULT NURSE NOTE - ED STAT RN HANDOFF DETAILS
Pt AOX3, in stable condition, endorsed to nurse Jackelyn.
Yes-Patient/Caregiver accepts free interpretation services...

## 2021-03-24 ENCOUNTER — EMERGENCY (EMERGENCY)
Facility: HOSPITAL | Age: 76
LOS: 1 days | Discharge: HOME | End: 2021-03-24
Attending: EMERGENCY MEDICINE
Payer: COMMERCIAL

## 2021-03-24 VITALS
HEIGHT: 71 IN | OXYGEN SATURATION: 96 % | HEART RATE: 83 BPM | DIASTOLIC BLOOD PRESSURE: 73 MMHG | RESPIRATION RATE: 20 BRPM | SYSTOLIC BLOOD PRESSURE: 135 MMHG | WEIGHT: 179.9 LBS | TEMPERATURE: 99 F

## 2021-03-24 DIAGNOSIS — Z95.5 PRESENCE OF CORONARY ANGIOPLASTY IMPLANT AND GRAFT: Chronic | ICD-10-CM

## 2021-03-24 DIAGNOSIS — Z90.49 ACQUIRED ABSENCE OF OTHER SPECIFIED PARTS OF DIGESTIVE TRACT: Chronic | ICD-10-CM

## 2021-03-24 DIAGNOSIS — Z98.890 OTHER SPECIFIED POSTPROCEDURAL STATES: Chronic | ICD-10-CM

## 2021-03-24 LAB
ALBUMIN SERPL ELPH-MCNC: 3.9 G/DL — SIGNIFICANT CHANGE UP (ref 3.5–5)
ALP SERPL-CCNC: 35 U/L — LOW (ref 40–120)
ALT FLD-CCNC: 25 U/L DA — SIGNIFICANT CHANGE UP (ref 10–60)
ANION GAP SERPL CALC-SCNC: 7 MMOL/L — SIGNIFICANT CHANGE UP (ref 5–17)
AST SERPL-CCNC: 24 U/L — SIGNIFICANT CHANGE UP (ref 10–40)
BASOPHILS # BLD AUTO: 0.02 K/UL — SIGNIFICANT CHANGE UP (ref 0–0.2)
BASOPHILS NFR BLD AUTO: 0.2 % — SIGNIFICANT CHANGE UP (ref 0–2)
BILIRUB SERPL-MCNC: 0.4 MG/DL — SIGNIFICANT CHANGE UP (ref 0.2–1.2)
BUN SERPL-MCNC: 24 MG/DL — HIGH (ref 7–18)
CALCIUM SERPL-MCNC: 9.4 MG/DL — SIGNIFICANT CHANGE UP (ref 8.4–10.5)
CHLORIDE SERPL-SCNC: 107 MMOL/L — SIGNIFICANT CHANGE UP (ref 96–108)
CO2 SERPL-SCNC: 27 MMOL/L — SIGNIFICANT CHANGE UP (ref 22–31)
CREAT SERPL-MCNC: 1.16 MG/DL — SIGNIFICANT CHANGE UP (ref 0.5–1.3)
EOSINOPHIL # BLD AUTO: 0.1 K/UL — SIGNIFICANT CHANGE UP (ref 0–0.5)
EOSINOPHIL NFR BLD AUTO: 1.1 % — SIGNIFICANT CHANGE UP (ref 0–6)
GLUCOSE SERPL-MCNC: 116 MG/DL — HIGH (ref 70–99)
HCT VFR BLD CALC: 37.3 % — LOW (ref 39–50)
HGB BLD-MCNC: 12.8 G/DL — LOW (ref 13–17)
IMM GRANULOCYTES NFR BLD AUTO: 0.5 % — SIGNIFICANT CHANGE UP (ref 0–1.5)
LIDOCAIN IGE QN: 154 U/L — SIGNIFICANT CHANGE UP (ref 73–393)
LYMPHOCYTES # BLD AUTO: 1.11 K/UL — SIGNIFICANT CHANGE UP (ref 1–3.3)
LYMPHOCYTES # BLD AUTO: 12.1 % — LOW (ref 13–44)
MCHC RBC-ENTMCNC: 34.1 PG — HIGH (ref 27–34)
MCHC RBC-ENTMCNC: 34.3 GM/DL — SIGNIFICANT CHANGE UP (ref 32–36)
MCV RBC AUTO: 99.5 FL — SIGNIFICANT CHANGE UP (ref 80–100)
MONOCYTES # BLD AUTO: 0.85 K/UL — SIGNIFICANT CHANGE UP (ref 0–0.9)
MONOCYTES NFR BLD AUTO: 9.3 % — SIGNIFICANT CHANGE UP (ref 2–14)
NEUTROPHILS # BLD AUTO: 7.04 K/UL — SIGNIFICANT CHANGE UP (ref 1.8–7.4)
NEUTROPHILS NFR BLD AUTO: 76.8 % — SIGNIFICANT CHANGE UP (ref 43–77)
NRBC # BLD: 0 /100 WBCS — SIGNIFICANT CHANGE UP (ref 0–0)
PLATELET # BLD AUTO: 237 K/UL — SIGNIFICANT CHANGE UP (ref 150–400)
POTASSIUM SERPL-MCNC: 4 MMOL/L — SIGNIFICANT CHANGE UP (ref 3.5–5.3)
POTASSIUM SERPL-SCNC: 4 MMOL/L — SIGNIFICANT CHANGE UP (ref 3.5–5.3)
PROT SERPL-MCNC: 7.7 G/DL — SIGNIFICANT CHANGE UP (ref 6–8.3)
RBC # BLD: 3.75 M/UL — LOW (ref 4.2–5.8)
RBC # FLD: 12.7 % — SIGNIFICANT CHANGE UP (ref 10.3–14.5)
SARS-COV-2 RNA SPEC QL NAA+PROBE: SIGNIFICANT CHANGE UP
SODIUM SERPL-SCNC: 141 MMOL/L — SIGNIFICANT CHANGE UP (ref 135–145)
TROPONIN I SERPL-MCNC: <0.015 NG/ML — SIGNIFICANT CHANGE UP (ref 0–0.04)
WBC # BLD: 9.17 K/UL — SIGNIFICANT CHANGE UP (ref 3.8–10.5)
WBC # FLD AUTO: 9.17 K/UL — SIGNIFICANT CHANGE UP (ref 3.8–10.5)

## 2021-03-24 PROCEDURE — 93005 ELECTROCARDIOGRAM TRACING: CPT

## 2021-03-24 PROCEDURE — 36415 COLL VENOUS BLD VENIPUNCTURE: CPT

## 2021-03-24 PROCEDURE — 83690 ASSAY OF LIPASE: CPT

## 2021-03-24 PROCEDURE — 80053 COMPREHEN METABOLIC PANEL: CPT

## 2021-03-24 PROCEDURE — 87635 SARS-COV-2 COVID-19 AMP PRB: CPT

## 2021-03-24 PROCEDURE — 84484 ASSAY OF TROPONIN QUANT: CPT

## 2021-03-24 PROCEDURE — 85025 COMPLETE CBC W/AUTO DIFF WBC: CPT

## 2021-03-24 PROCEDURE — 71045 X-RAY EXAM CHEST 1 VIEW: CPT | Mod: 26

## 2021-03-24 PROCEDURE — 99284 EMERGENCY DEPT VISIT MOD MDM: CPT | Mod: 25

## 2021-03-24 PROCEDURE — 99283 EMERGENCY DEPT VISIT LOW MDM: CPT

## 2021-03-24 PROCEDURE — 96374 THER/PROPH/DIAG INJ IV PUSH: CPT

## 2021-03-24 PROCEDURE — 71045 X-RAY EXAM CHEST 1 VIEW: CPT

## 2021-03-24 RX ORDER — SODIUM CHLORIDE 9 MG/ML
1000 INJECTION INTRAMUSCULAR; INTRAVENOUS; SUBCUTANEOUS ONCE
Refills: 0 | Status: COMPLETED | OUTPATIENT
Start: 2021-03-24 | End: 2021-03-24

## 2021-03-24 RX ORDER — CIPROFLOXACIN LACTATE 400MG/40ML
1 VIAL (ML) INTRAVENOUS
Qty: 4 | Refills: 0
Start: 2021-03-24 | End: 2021-03-27

## 2021-03-24 RX ORDER — METOCLOPRAMIDE HCL 10 MG
10 TABLET ORAL ONCE
Refills: 0 | Status: COMPLETED | OUTPATIENT
Start: 2021-03-24 | End: 2021-03-24

## 2021-03-24 RX ORDER — MECLIZINE HCL 12.5 MG
1 TABLET ORAL
Qty: 6 | Refills: 0
Start: 2021-03-24

## 2021-03-24 RX ADMIN — SODIUM CHLORIDE 1000 MILLILITER(S): 9 INJECTION INTRAMUSCULAR; INTRAVENOUS; SUBCUTANEOUS at 20:31

## 2021-03-24 RX ADMIN — Medication 104 MILLIGRAM(S): at 20:31

## 2021-03-24 NOTE — ED PROVIDER NOTE - PMH
CAD (coronary artery disease)    Herpes simplex  2017  HLD (hyperlipidemia)    HTN (hypertension)    Prediabetes    Stented coronary artery  x3 2017  Varicose vein of leg  s/p surgery  Vitamin D deficiency

## 2021-03-24 NOTE — ED PROVIDER NOTE - PHYSICAL EXAMINATION
No
Afebrile, hemodynamically stable, saturating well  NAD, well appearing, laying comfortably in bed, no WOB, intermittent cough  Head NCAT  EOMI grossly, anicteric  MMM  No JVD  RRR, nml S1/S2, no m/r/g  Lungs CTAB, no w/r/r  Abd soft, NT, ND, nml BS, no rebound or guarding  AAO, CN's 3-12 intact, motor 5/5 and sens symm in all extrems, F-N nml, nml gait  MENDES spontaneously, no leg cyanosis or edema  Skin warm, dry

## 2021-03-24 NOTE — ED ADULT NURSE NOTE - NS ED NURSE IV DC DT
R hand wrapped with Ace bandage for compression. CMS intact, tolerated well. Instructed to remove bandage frequently to check for worsening redness.   24-Mar-2021 22:25

## 2021-03-24 NOTE — ED PROVIDER NOTE - CLINICAL SUMMARY MEDICAL DECISION MAKING FREE TEXT BOX
No CP/SOB or ECG changes to suggest ACS or PE. Noted sinus austin, no current dizziness and low suspicion for this as cause. Character low suspicion for CVA and no focal or localizing findings. No abdominal pain or tenderness with low suspicion for acute process. No anemia or bleeding o No CP/SOB or ECG changes to suggest ACS or PE. Noted sinus austin, no current dizziness and low suspicion for this as cause. Character low suspicion for CVA and no focal or localizing findings. No abdominal pain or tenderness with low suspicion for acute process. No anemia or bleeding or gross electrolyte abnormalities. Pt with phlegm and cough and noted PNA on CXR which may be cause of his symptoms. No WOB or desats. PORT score 76 low-risk. No more vomiting or dizziness. Patient is well appearing, NAD, afebrile, hemodynamically stable. Given abx. Any available tests and studies were discussed with patient. Discharged with rx abx, instructions in further symptomatic care, return precautions, and need for PMD f/u.

## 2021-03-24 NOTE — ED PROVIDER NOTE - PSH
H/O heart artery stent  x 3 2017  H/O varicose vein ligation and stripping    S/P laparoscopic cholecystectomy  1/2019

## 2021-03-24 NOTE — ED PROVIDER NOTE - NSFOLLOWUPINSTRUCTIONS_ED_ALL_ED_FT
Please follow up with your primary care doctor in 1-2 days.  Please take the antibiotic as prescribed.  Please keep well hydrated.  Please use meclizine ONLY if you need it for dizziness.  Please return to the emergency department if you have difficulty breathing, vomiting and unable to keep hydrated, fever, or any other symptoms.

## 2021-03-24 NOTE — ED PROVIDER NOTE - PATIENT PORTAL LINK FT
You can access the FollowMyHealth Patient Portal offered by [facility] by registering at the following website: [email or web address]. By joining [facility]’s FollowMyHealth portal, you will also be able to view your health information using other applications (apps) compatible with our system.

## 2021-03-24 NOTE — ED PROVIDER NOTE - OBJECTIVE STATEMENT
75yoM with h/o CAD s/p stents (last 3 yrs ago), HTN, HLD, DM, vertigo, presents with NBNB vomiting onset 2pm. He had some of his usual vertigo as well though this has resolved. Associated cough, congestion. Denies fever, CP, SOB, abdominal pain, leg pain or swelling, and all other symptoms.

## 2021-03-25 PROBLEM — Z95.5 PRESENCE OF CORONARY ANGIOPLASTY IMPLANT AND GRAFT: Chronic | Status: ACTIVE | Noted: 2018-10-10

## 2021-03-25 PROBLEM — B00.9 HERPESVIRAL INFECTION, UNSPECIFIED: Chronic | Status: ACTIVE | Noted: 2018-11-30

## 2021-03-25 PROBLEM — I83.90 ASYMPTOMATIC VARICOSE VEINS OF UNSPECIFIED LOWER EXTREMITY: Chronic | Status: ACTIVE | Noted: 2018-11-30

## 2022-07-18 NOTE — ASU PREOP CHECKLIST - SITE MARKED BY ANESTHESIOLOGIST
Patient has started sleepwalking 5 days ago after increased dosage of Sonata.  Very groggy in the mornings.  Please call and advise.  
Per last visit note from 7/7/22, increased sonata to 20 mg at hs    Routed to provider for review/recommendation.    
Writer spoke to Beverley.  Pt appears to have walked down the hallway and did not experience any injuries.  Writer will decrease his sonata to 10mg as per his previous dose.  
n/a

## 2023-02-22 NOTE — H&P ADULT - NSCORESITESY/N_GEN_A_CORE_RD
-- DO NOT REPLY / DO NOT REPLY ALL --  -- Message is from Engagement Center Operations (ECO) --    General Patient Message: Pt calling to reschedule appt for 2/23/23 at 1 pm.    Caller Information       Type Contact Phone/Fax    02/22/2023 04:11 PM CST Phone (Incoming) ManpreetleenaneftaliAlexandria (Self) [phone] (M)        Alternative phone number: No     Can a detailed message be left? Yes    Message Turnaround: [place]:    Refer to site's KB page for routing instructions    Please give this turnaround time to the caller:   \"You can expect to receive a response 2-3 business days after your provider's clinical team reviews the message\"               Yes

## 2023-03-22 NOTE — H&P PST ADULT - TEACHING/LEARNING LEARNING PREFERENCES
Klisyri Pregnancy And Lactation Text: It is unknown if this medication can harm a developing fetus or if it is excreted in breast milk. written material/verbal instruction

## 2024-03-20 NOTE — H&P PST ADULT - HEIGHT IN INCHES
Patient wife called to inform they want to drop off left hearing aid but would like to know if they can drop off tomorrow 3pm since they are waiting for lender to get hold of battery for left ear.      
Per spouse will be dropping off left hearing aid tomorrow, states it needs a battery replacement, also would like loaner hearing aid. Please advise thank you.  
Spoke to wife.   Picking up diana tomorrow will be fine.   Diana is waiting at  and they will drop off his left aid.   
TANO.   Diana is ready for  when she wants to drop off the left aid.   Reminded that the loaner will be using a rubber dome on the end rather than his own custom mold, so may not fit as well or securely for that reason.   We will send left aid in for repair under warranty.   (Right aid was recently sent in as well).    
0

## 2024-05-15 NOTE — ED ADULT NURSE NOTE - PAIN RATING/NUMBER SCALE (0-10): ACTIVITY
[de-identified] : Patient allowed to gently start resuming activities. Discussed change to medication prescription and usage. Bracing options discussed with patient. Activity modification as needed Discussed poss future surgery, TKA try topical lidocaine reviewed current medications used by this patient 5/15/2024    RE[facility]  [facility] MEDEROS   [id number]    Attention:  Nurse Reviewer /Medical Director  I am writing this letter as a medical necessity for PT program. Patient has tried analgesics, non-steroid anti-inflammatory agents,  hot or cold compresses,injections of corticosteroids, etc)  which in combination or by themselves has not worked. Based on my patient's condition, I strongly believe that the PT is medically needed.   Thank you for your time and consideration.    offered csi he declined 5

## 2025-03-13 NOTE — ASU PREOP CHECKLIST - WEIGHT IN KG
[facility]   
79.4